# Patient Record
Sex: MALE | Race: WHITE | NOT HISPANIC OR LATINO | Employment: OTHER | URBAN - METROPOLITAN AREA
[De-identification: names, ages, dates, MRNs, and addresses within clinical notes are randomized per-mention and may not be internally consistent; named-entity substitution may affect disease eponyms.]

---

## 2017-03-31 ENCOUNTER — ALLSCRIPTS OFFICE VISIT (OUTPATIENT)
Dept: OTHER | Facility: OTHER | Age: 72
End: 2017-03-31

## 2017-03-31 ENCOUNTER — APPOINTMENT (OUTPATIENT)
Dept: LAB | Facility: HOSPITAL | Age: 72
End: 2017-03-31
Attending: UROLOGY
Payer: MEDICARE

## 2017-03-31 DIAGNOSIS — R35.0 FREQUENCY OF MICTURITION: ICD-10-CM

## 2017-03-31 DIAGNOSIS — N39.0 URINARY TRACT INFECTION: ICD-10-CM

## 2017-03-31 LAB
CLARITY UR: NORMAL
COLOR UR: YELLOW
GLUCOSE (HISTORICAL): NORMAL
HGB UR QL STRIP.AUTO: NORMAL
KETONES UR STRIP-MCNC: NORMAL MG/DL
LEUKOCYTE ESTERASE UR QL STRIP: NORMAL
NITRITE UR QL STRIP: NORMAL
PH UR STRIP.AUTO: 6.5 [PH]
PROT UR STRIP-MCNC: NORMAL MG/DL
SP GR UR STRIP.AUTO: 1.01

## 2017-03-31 PROCEDURE — 87086 URINE CULTURE/COLONY COUNT: CPT

## 2017-03-31 PROCEDURE — 87186 SC STD MICRODIL/AGAR DIL: CPT

## 2017-03-31 PROCEDURE — 87077 CULTURE AEROBIC IDENTIFY: CPT

## 2017-04-02 LAB — BACTERIA UR CULT: NORMAL

## 2017-04-15 ENCOUNTER — LAB CONVERSION - ENCOUNTER (OUTPATIENT)
Dept: OTHER | Facility: OTHER | Age: 72
End: 2017-04-15

## 2017-04-15 LAB
BACTERIA UR QL AUTO: ABNORMAL /HPF
BILIRUB UR QL STRIP: NEGATIVE
COLOR UR: YELLOW
COMMENT (HISTORICAL): ABNORMAL
CULTURE RESULT (HISTORICAL): ABNORMAL
FECAL OCCULT BLOOD DIAGNOSTIC (HISTORICAL): ABNORMAL
GLUCOSE (HISTORICAL): NEGATIVE
HYALINE CASTS #/AREA URNS LPF: ABNORMAL /LPF
KETONES UR STRIP-MCNC: NEGATIVE MG/DL
LEUKOCYTE ESTERASE UR QL STRIP: ABNORMAL
NITRITE UR QL STRIP: POSITIVE
PH UR STRIP.AUTO: 5.5 [PH] (ref 5–8)
PROT UR STRIP-MCNC: ABNORMAL MG/DL
RBC (HISTORICAL): ABNORMAL /HPF
SP GR UR STRIP.AUTO: 1.02 (ref 1–1.03)
SQUAMOUS EPITHELIAL CELLS (HISTORICAL): ABNORMAL /HPF
WBC # BLD AUTO: ABNORMAL /HPF

## 2017-04-17 ENCOUNTER — LAB CONVERSION - ENCOUNTER (OUTPATIENT)
Dept: OTHER | Facility: OTHER | Age: 72
End: 2017-04-17

## 2017-04-17 ENCOUNTER — HOSPITAL ENCOUNTER (OUTPATIENT)
Dept: RADIOLOGY | Facility: MEDICAL CENTER | Age: 72
Discharge: HOME | End: 2017-04-17
Payer: MEDICARE

## 2017-04-17 DIAGNOSIS — N39.0 URINARY TRACT INFECTION: ICD-10-CM

## 2017-04-17 LAB
BACTERIA UR QL AUTO: ABNORMAL /HPF
BILIRUB UR QL STRIP: NEGATIVE
COLOR UR: YELLOW
COMMENT (HISTORICAL): ABNORMAL
FECAL OCCULT BLOOD DIAGNOSTIC (HISTORICAL): ABNORMAL
GLUCOSE (HISTORICAL): NEGATIVE
HYALINE CASTS #/AREA URNS LPF: ABNORMAL /LPF
KETONES UR STRIP-MCNC: NEGATIVE MG/DL
LEUKOCYTE ESTERASE UR QL STRIP: ABNORMAL
NITRITE UR QL STRIP: POSITIVE
PH UR STRIP.AUTO: 5.5 [PH] (ref 5–8)
PROT UR STRIP-MCNC: ABNORMAL MG/DL
RBC (HISTORICAL): ABNORMAL /HPF
SP GR UR STRIP.AUTO: 1.02 (ref 1–1.03)
SQUAMOUS EPITHELIAL CELLS (HISTORICAL): ABNORMAL /HPF
WBC # BLD AUTO: ABNORMAL /HPF

## 2017-04-17 PROCEDURE — 51798 US URINE CAPACITY MEASURE: CPT

## 2017-05-05 DIAGNOSIS — R80.9 PROTEINURIA: ICD-10-CM

## 2017-05-05 DIAGNOSIS — D86.9 SARCOIDOSIS: ICD-10-CM

## 2017-05-05 DIAGNOSIS — N18.2 CHRONIC KIDNEY DISEASE, STAGE II (MILD): ICD-10-CM

## 2017-05-05 DIAGNOSIS — R60.0 LOCALIZED EDEMA: ICD-10-CM

## 2017-05-05 DIAGNOSIS — N39.0 URINARY TRACT INFECTION: ICD-10-CM

## 2017-05-06 ENCOUNTER — LAB CONVERSION - ENCOUNTER (OUTPATIENT)
Dept: OTHER | Facility: OTHER | Age: 72
End: 2017-05-06

## 2017-05-06 LAB
BACTERIA UR QL AUTO: ABNORMAL /HPF
BILIRUB UR QL STRIP: NEGATIVE
COLOR UR: YELLOW
COMMENT (HISTORICAL): CLEAR
CULTURE RESULT (HISTORICAL): NORMAL
FECAL OCCULT BLOOD DIAGNOSTIC (HISTORICAL): NEGATIVE
GLUCOSE (HISTORICAL): NEGATIVE
HYALINE CASTS #/AREA URNS LPF: ABNORMAL /LPF
KETONES UR STRIP-MCNC: NEGATIVE MG/DL
LEUKOCYTE ESTERASE UR QL STRIP: ABNORMAL
NITRITE UR QL STRIP: NEGATIVE
PH UR STRIP.AUTO: 5.5 [PH] (ref 5–8)
PROT UR STRIP-MCNC: NEGATIVE MG/DL
RBC (HISTORICAL): ABNORMAL /HPF
SP GR UR STRIP.AUTO: 1.02 (ref 1–1.03)
SQUAMOUS EPITHELIAL CELLS (HISTORICAL): ABNORMAL /HPF
WBC # BLD AUTO: ABNORMAL /HPF

## 2017-05-09 ENCOUNTER — ALLSCRIPTS OFFICE VISIT (OUTPATIENT)
Dept: OTHER | Facility: OTHER | Age: 72
End: 2017-05-09

## 2017-05-09 LAB
BILIRUB UR QL STRIP: NORMAL
CLARITY UR: NORMAL
COLOR UR: NORMAL
GLUCOSE (HISTORICAL): NORMAL
HGB UR QL STRIP.AUTO: NORMAL
KETONES UR STRIP-MCNC: NORMAL MG/DL
LEUKOCYTE ESTERASE UR QL STRIP: NORMAL
NITRITE UR QL STRIP: NORMAL
PH UR STRIP.AUTO: 5 [PH]
PROT UR STRIP-MCNC: NORMAL MG/DL
SP GR UR STRIP.AUTO: 1.02
UROBILINOGEN UR QL STRIP.AUTO: 0.2

## 2017-05-17 ENCOUNTER — LAB CONVERSION - ENCOUNTER (OUTPATIENT)
Dept: OTHER | Facility: OTHER | Age: 72
End: 2017-05-17

## 2017-05-17 LAB
25(OH)D3 SERPL-MCNC: 24 NG/ML (ref 30–100)
ALBUMIN SERPL BCP-MCNC: 3.5 G/DL (ref 3.8–4.8)
ALPHA 1 (HISTORICAL): 0.3 G/DL (ref 0.2–0.3)
ALPHA 2 (HISTORICAL): 0.7 G/DL (ref 0.5–0.9)
BETA-1 (HISTORICAL): 0.4 G/DL (ref 0.4–0.6)
BETA-2 (HISTORICAL): 0.4 G/DL (ref 0.2–0.5)
CALCIUM SERPL-MCNC: 9.1 MG/DL (ref 8.6–10.3)
CREATININE, RANDOM URINE (HISTORICAL): 133 MG/DL (ref 20–370)
GAMMA GLOBULIN (HISTORICAL): 1.2 G/DL (ref 0.8–1.7)
INTERPRETATION (HISTORICAL): ABNORMAL
PROT UR-MCNC: 12 MG/DL (ref 5–25)
PROT/CREAT UR: 90 MG/G CREAT (ref 22–128)
PTH-INTACT SERPL-MCNC: 31 PG/ML (ref 14–64)
TOTAL PROTEIN (HISTORICAL): 6.6 G/DL (ref 6.1–8.1)

## 2017-05-18 ENCOUNTER — LAB CONVERSION - ENCOUNTER (OUTPATIENT)
Dept: OTHER | Facility: OTHER | Age: 72
End: 2017-05-18

## 2017-05-18 LAB
25(OH)D3 SERPL-MCNC: 24 NG/ML (ref 30–100)
ALBUMIN SERPL BCP-MCNC: 100 %
ALBUMIN SERPL BCP-MCNC: 3.5 G/DL (ref 3.8–4.8)
ALPHA 1 (HISTORICAL): 0 %
ALPHA 1 (HISTORICAL): 0.3 G/DL (ref 0.2–0.3)
ALPHA 2 (HISTORICAL): 0 %
ALPHA 2 (HISTORICAL): 0.7 G/DL (ref 0.5–0.9)
BACTERIA UR QL AUTO: NORMAL /HPF
BETA-1 (HISTORICAL): 0.4 G/DL (ref 0.4–0.6)
BETA-2 (HISTORICAL): 0.4 G/DL (ref 0.2–0.5)
BETA-2 MICROGLOBULIN (HISTORICAL): 0 %
BILIRUB UR QL STRIP: NEGATIVE
BUN SERPL-MCNC: 21 MG/DL (ref 7–25)
BUN/CREA RATIO (HISTORICAL): ABNORMAL (CALC) (ref 6–22)
CALCIUM SERPL-MCNC: 9.1 MG/DL (ref 8.6–10.3)
CALCIUM SERPL-MCNC: 9.1 MG/DL (ref 8.6–10.3)
CHLORIDE SERPL-SCNC: 105 MMOL/L (ref 98–110)
CO2 SERPL-SCNC: 30 MMOL/L (ref 20–31)
COLOR UR: YELLOW
COMMENT (HISTORICAL): CLEAR
CREAT SERPL-MCNC: 1.18 MG/DL (ref 0.7–1.18)
CREATININE, RANDOM URINE (HISTORICAL): 133 MG/DL (ref 20–370)
CULTURE RESULT (HISTORICAL): NORMAL
EGFR AFRICAN AMERICAN (HISTORICAL): 71 ML/MIN/1.73M2
EGFR-AMERICAN CALC (HISTORICAL): 61 ML/MIN/1.73M2
FECAL OCCULT BLOOD DIAGNOSTIC (HISTORICAL): NEGATIVE
GAMMA GLOBULIN (HISTORICAL): 0 %
GAMMA GLOBULIN (HISTORICAL): 1.2 G/DL (ref 0.8–1.7)
GLUCOSE (HISTORICAL): 172 MG/DL (ref 65–99)
GLUCOSE (HISTORICAL): NEGATIVE
HYALINE CASTS #/AREA URNS LPF: NORMAL /LPF
INTERPRETATION (HISTORICAL): ABNORMAL
INTERPRETATION (HISTORICAL): NORMAL
KETONES UR STRIP-MCNC: NEGATIVE MG/DL
LEUKOCYTE ESTERASE UR QL STRIP: NEGATIVE
MAGNESIUM SERPL-MCNC: 1.8 MG/DL (ref 1.5–2.5)
NITRITE UR QL STRIP: NEGATIVE
PH UR STRIP.AUTO: 6 [PH] (ref 5–8)
POTASSIUM SERPL-SCNC: 4.6 MMOL/L (ref 3.5–5.3)
PROT UR STRIP-MCNC: NEGATIVE MG/DL
PROT UR-MCNC: 12 MG/DL (ref 5–25)
PROT/CREAT UR: 90 MG/G CREAT (ref 22–128)
PTH-INTACT SERPL-MCNC: 31 PG/ML (ref 14–64)
RBC (HISTORICAL): NORMAL /HPF
SODIUM SERPL-SCNC: 139 MMOL/L (ref 135–146)
SP GR UR STRIP.AUTO: 1.02 (ref 1–1.03)
SQUAMOUS EPITHELIAL CELLS (HISTORICAL): NORMAL /HPF
TOTAL PROTEIN (HISTORICAL): 6.6 G/DL (ref 6.1–8.1)
WBC # BLD AUTO: NORMAL /HPF

## 2017-05-25 ENCOUNTER — TRANSCRIBE ORDERS (OUTPATIENT)
Dept: SLEEP CENTER | Facility: CLINIC | Age: 72
End: 2017-05-25

## 2017-05-25 ENCOUNTER — HOSPITAL ENCOUNTER (OUTPATIENT)
Dept: SLEEP CENTER | Facility: CLINIC | Age: 72
Discharge: HOME | End: 2017-05-25
Payer: MEDICARE

## 2017-05-25 DIAGNOSIS — G47.33 OSA (OBSTRUCTIVE SLEEP APNEA): ICD-10-CM

## 2017-05-25 DIAGNOSIS — G47.33 OSA (OBSTRUCTIVE SLEEP APNEA): Primary | ICD-10-CM

## 2017-05-30 ENCOUNTER — ALLSCRIPTS OFFICE VISIT (OUTPATIENT)
Dept: OTHER | Facility: OTHER | Age: 72
End: 2017-05-30

## 2017-07-11 ENCOUNTER — ALLSCRIPTS OFFICE VISIT (OUTPATIENT)
Dept: OTHER | Facility: OTHER | Age: 72
End: 2017-07-11

## 2017-07-11 DIAGNOSIS — R60.0 LOCALIZED EDEMA: ICD-10-CM

## 2017-07-11 DIAGNOSIS — I10 ESSENTIAL (PRIMARY) HYPERTENSION: ICD-10-CM

## 2017-07-18 DIAGNOSIS — R60.0 LOCALIZED EDEMA: ICD-10-CM

## 2017-07-18 LAB
BUN SERPL-MCNC: 20 MG/DL (ref 7–25)
BUN/CREA RATIO (HISTORICAL): ABNORMAL (CALC) (ref 6–22)
CALCIUM SERPL-MCNC: 9.4 MG/DL (ref 8.6–10.3)
CHLORIDE SERPL-SCNC: 105 MMOL/L (ref 98–110)
CO2 SERPL-SCNC: 28 MMOL/L (ref 20–31)
CREAT SERPL-MCNC: 1.17 MG/DL (ref 0.7–1.18)
EGFR AFRICAN AMERICAN (HISTORICAL): 72 ML/MIN/1.73M2
EGFR-AMERICAN CALC (HISTORICAL): 62 ML/MIN/1.73M2
GLUCOSE (HISTORICAL): 136 MG/DL (ref 65–99)
POTASSIUM SERPL-SCNC: 4.3 MMOL/L (ref 3.5–5.3)
SODIUM SERPL-SCNC: 140 MMOL/L (ref 135–146)

## 2017-07-26 ENCOUNTER — HOSPITAL ENCOUNTER (OUTPATIENT)
Dept: SLEEP CENTER | Facility: CLINIC | Age: 72
Discharge: HOME | End: 2017-07-26
Payer: MEDICARE

## 2017-07-26 ENCOUNTER — TRANSCRIBE ORDERS (OUTPATIENT)
Dept: SLEEP CENTER | Facility: CLINIC | Age: 72
End: 2017-07-26

## 2017-07-26 DIAGNOSIS — G47.33 OSA TREATED WITH BIPAP: Primary | ICD-10-CM

## 2017-07-26 DIAGNOSIS — G47.33 OSA (OBSTRUCTIVE SLEEP APNEA): ICD-10-CM

## 2017-07-27 ENCOUNTER — HOSPITAL ENCOUNTER (OUTPATIENT)
Dept: NON INVASIVE DIAGNOSTICS | Facility: CLINIC | Age: 72
Discharge: HOME | End: 2017-07-27
Payer: MEDICARE

## 2017-07-27 DIAGNOSIS — R60.0 LOCALIZED EDEMA: ICD-10-CM

## 2017-07-27 DIAGNOSIS — I10 ESSENTIAL (PRIMARY) HYPERTENSION: ICD-10-CM

## 2017-07-27 PROCEDURE — 93306 TTE W/DOPPLER COMPLETE: CPT

## 2017-11-01 DIAGNOSIS — N18.2 CHRONIC KIDNEY DISEASE, STAGE II (MILD): ICD-10-CM

## 2017-11-01 DIAGNOSIS — I51.9 HEART DISEASE: ICD-10-CM

## 2017-11-01 DIAGNOSIS — R60.0 LOCALIZED EDEMA: ICD-10-CM

## 2017-11-02 ENCOUNTER — LAB CONVERSION - ENCOUNTER (OUTPATIENT)
Dept: OTHER | Facility: OTHER | Age: 72
End: 2017-11-02

## 2017-11-02 LAB
ALBUMIN SERPL BCP-MCNC: 3.9 G/DL (ref 3.6–5.1)
BUN SERPL-MCNC: 24 MG/DL (ref 7–25)
BUN/CREA RATIO (HISTORICAL): NORMAL (CALC) (ref 6–22)
CALCIUM SERPL-MCNC: 9.3 MG/DL (ref 8.6–10.3)
CHLORIDE SERPL-SCNC: 106 MMOL/L (ref 98–110)
CO2 SERPL-SCNC: 29 MMOL/L (ref 20–31)
CREAT SERPL-MCNC: 1.18 MG/DL (ref 0.7–1.18)
CREATININE, RANDOM URINE (HISTORICAL): 196 MG/DL (ref 20–370)
EGFR AFRICAN AMERICAN (HISTORICAL): 71 ML/MIN/1.73M2
EGFR-AMERICAN CALC (HISTORICAL): 61 ML/MIN/1.73M2
GLUCOSE (HISTORICAL): 99 MG/DL (ref 65–99)
MAGNESIUM SERPL-MCNC: 1.7 MG/DL (ref 1.5–2.5)
PHOSPHATE SERPL-MCNC: 3.1 MG/DL (ref 2.1–4.3)
POTASSIUM SERPL-SCNC: 4.7 MMOL/L (ref 3.5–5.3)
PROT UR-MCNC: 17 MG/DL (ref 5–25)
PROT/CREAT UR: 87 MG/G CREAT (ref 22–128)
SODIUM SERPL-SCNC: 142 MMOL/L (ref 135–146)

## 2017-11-16 ENCOUNTER — ALLSCRIPTS OFFICE VISIT (OUTPATIENT)
Dept: OTHER | Facility: OTHER | Age: 72
End: 2017-11-16

## 2017-11-17 NOTE — PROGRESS NOTES
Assessment    1  CKD (chronic kidney disease), stage II (585 2) (N18 2)   2  Hypertension (401 9) (I10)   3  Diastolic dysfunction (495 2) (I51 9)   4  Bilateral leg edema (782 3) (R60 0)    Plan  Bilateral leg edema, CKD (chronic kidney disease), stage II, Diastolic dysfunction,Hypertension    · Follow-up visit in 4 Months Evaluation and Treatment  Follow-up  Status: Hold For -Scheduling  Requested for: 50VQV4693   Ordered;Bilateral leg edema, CKD (chronic kidney disease), stage II, Diastolic dysfunction, Hypertension; Ordered By: Edwige Aviles Performed:  Due: 60ARQ2047  Bilateral leg edema, Diastolic dysfunction    · From  Torsemide 20 MG Oral Tablet Take 2 tablets daily To Torsemide 20 MGOral Tablet TAKE 2 TABLETS TWICE DAILY   Rx By: Edwige Aviles; Dispense: 90 Days ; #:360 Tablet; Refill: 3;For: Bilateral leg edema, Diastolic dysfunction; JITENDRA = N; Faxed To: [facility] [employer] [facility]   · (1) BASIC METABOLIC PROFILE; Status:Active; Requested for:27Nov2017;    Perform:[facility]; FRU:[id number]; Ordered;leg edema, Diastolic dysfunction; Ordered By:Cesar Greene;   · Follow-up visit in 2 months Evaluation and Treatment  Follow-up  Status: Hold For -Scheduling  Requested for: 65QFX3485   Ordered; For: Bilateral leg edema, Diastolic dysfunction; Ordered By: Edwige Aviles Performed:  Due: 89RFC3431  CKD (chronic kidney disease), stage II    · (1) MAGNESIUM; Status:Active; Requested VXZ:[id number]; Perform:[facility]; KYI:[id number];ODWGKWC; For:CKD (chronic kidney disease), stage II; Ordered By:Cesar Greene;   · (1) PTH N-TERMINAL (INTACT); Status:Active; Requested SRU:[id number]; Perform:[facility]; JFZ:[id number];YUDPDGK; For:CKD (chronic kidney disease), stage II; Ordered By:Cesar Greene;   · (1) RENAL FUNCTION PANEL; Status:Active; Requested SWQ:[id number]; Perform:[facility]; EMA:[id number];NAZZKCO; For:CKD (chronic kidney disease), stage II; Ordered By:Cesar Greene;   · (1) URINE PROTEIN CREATININE RATIO; Status:Active; Requested RADHA:[id number]; Perform:[facility]; YMM:[id number];RICARDO;(XEFJZSR kidney disease), stage II; Ordered By:Cesar Greene;    Discussion/Summary    1  Chronic kidney disease, stage II/III: Kash baseline serum creatinine is in the range of 1 1 to 1 2  His renal function is currently stable  His CKD is possibly from DM nephropathy vs hypertensive nephrosclerosis  Edema: His edema is quite impressive and appears out of proportion to findings found on echocardiogram  His systolic function is preserved and he only has grade 1 diastolic dysfunction  There is no nephrotic syndrome noted on labs  He could have some degree of pulmonary HTN given his GUICHO and obesity but his RV size was normal with good systolic function  I discussed this with Dr Xochitl De La Torre and we agreed abdominal imaging is appropriate for further edema evaluation and he will take care of ordering this  For treatment, I asked Virginia Choi to increase Torsemide to 40 mg BID  I advised him to keep his legs elevated and to continue avoiding salt in his diet  We will check labs in 1-2 weeks after increasing Torsemide  I also asked Virginia Jimbo to track his weights and bring it with his next visit in the office  We will have him back in 2 months for volume management and diuretic adjustment  Hypertension: Monitor BP with diuresis  Microalbuminuria: Continue Losartan  Mineral and bone disease: PTH is at goal from May 2017  Continue Vitamin D3 2000 units daily  Increase Torsemide to 40 mg BID  Check BMP in 7-14 days  Consider abdominal imaging - discussed with Dr Xochitl De La Torre  Check daily weights  Follow up with our NP in 2 months for diuretic adjustment  Follow up with me in 4 months  DM, HLP, obesity, GUICHO, sarcoid, obesity, nephrolithiasis      The patient, patient's family was counseled regarding diagnostic results,-- instructions for management,-- risk factor reductions,-- prognosis,-- patient and family education,-- impressions,-- importance of compliance with treatment  Possible side effects of new medications were reviewed with the patient/guardian today  The treatment plan was reviewed with the patient/guardian  The patient/guardian understands and agrees with the treatment plan      Reason For Visit  Continued evaluation of chronic kidney disease      History of Present Illness  During his last visit, we increased Tyler's torsemide to 40 mg daily  However, it does not appear that he lost weight between last visit and today  He had an echocardiogram which showed a preserved EF but did show grade 1 diastolic dysfunction  He reports following a low-sodium diet and denies any orthopnea, PND, or shortness of breath at rest  He reports good compliance to medications  Review of Systems   Constitutional: no fever,-- no chills,-- no anorexia,-- no fatigue,-- no recent weight gain-- and-- no recent weight loss  Integumentary: no rashes  Gastrointestinal: no abdominal pain,-- no constipation,-- no nausea,-- no diarrhea-- and-- no vomiting  Respiratory: no shortness of breath,-- no cough-- and-- not coughing up sputum  Cardiovascular: lower extremity edema, but-- no chest pain-- and-- no palpitations  Musculoskeletal: joint pain  Neurological: no headache-- and-- no lightheadedness  Genitourinary: no dysuria-- and-- no hematuria  Current Meds   1  Finasteride 5 MG Oral Tablet; TAKE 1 TABLET DAILY; Therapy: (Recorded:09May2017) to Recorded   2  Glimepiride 4 MG Oral Tablet; Take 1 tablet twice daily; Therapy: (Recorded:09May2017) to Recorded   3  Losartan Potassium 100 MG Oral Tablet; Take 1 tablet daily; Therapy: (Recorded:09May2017) to Recorded   4  MetFORMIN HCl - 1000 MG Oral Tablet; TAKE 1 TABLET EVERY 12 HOURS; Therapy: (Recorded:09May2017) to Recorded   5  Metoprolol Tartrate 50 MG Oral Tablet; Take 1 tablet twice daily; Therapy: (Recorded:09May2017) to Recorded   6  NovoFine 32G X 6 MM Miscellaneous;  Therapy: 28OTN5774 to Recorded   7  Torsemide 20 MG Oral Tablet; Take 2 tablets daily; Therapy: 66MQH0112 to (Evaluate:54Znv0763)  Requested for: 97TZM8401; Last Rx:[id number] Ordered   8  Tradjenta 5 MG Oral Tablet; TAKE 1 TABLET DAILY; Therapy: 45ETZ9821 to Recorded   9  Vitamin D3 2000 UNIT Oral Capsule; take 1 capsule daily; Therapy: 91OAI9531 to Recorded    Allergies  1  Surgical TAPE    Vitals  Vital Signs    Recorded: 21LIK3059 10:21AM Recorded: 15VBY8999 67:50OV   Systolic 886, RUE, Sitting    Diastolic 68, RUE, Sitting    BP CUFF SIZE Large    Height  5 ft 5 in   Weight  318 lb    BMI Calculated  52 92   BSA Calculated  2 41       Physical Exam   Constitutional: General appearance: No acute distress, well appearing and well nourished  -- Conscious, coherent, cooperative, and not in any distress  ENT: External ears and nose appear normal     Eyes: Anicteric sclerae  -- Pink conjunctivae  Pulmonary: Respiratory effort: No increased work of breathing or signs of respiratory distress  -- Auscultation of lungs: Clear to auscultation  Cardiovascular: Distinct S1, S2, normal rate, regular rhythm  Abdomen:  Soft, obese  Extremities:  There was severe edema of both legs  (unchanged)  Rash:  No rash  Neurologic: Non Focal     Psychiatric: Orientation to person, place, and time: Normal  -- and-- Mood and affect: Normal        Results/Data  (1) RENAL FUNCTION PANEL [id number] 12:07PM [facility]     Test Name Result Flag Reference   GLUCOSE 99 mg/dL  65-99   Fasting reference interval   UREA NITROGEN (BUN) 24 mg/dL  7-25   CREATININE 1 18 mg/dL  0 70-1 18     For patients >52years of age, the reference limit for Creatinine is approximately 13% higher for people identified as -American  eGFR NON-AFR   AMERICAN 61 mL/min/1 73m2  > OR = 60   eGFR AFRICAN AMERICAN 71 mL/min/1 73m2  > OR = 60   BUN/CREATININE RATIO   2-72   NOT APPLICABLE (calc)   SODIUM 142 mmol/L  135-146   POTASSIUM 4 7 mmol/L  3 5-5 3   CHLORIDE 106 mmol/L     CARBON DIOXIDE 29 mmol/L  20-31   CALCIUM 9 3 mg/dL  8 6-10 3   PHOSPHATE (AS PHOSPHORUS) 3 1 mg/dL  2 1-4 3   ALBUMIN 3 9 g/dL  3 6-5 1     (1) MAGNESIUM [id number] 12:07PM Arthur Patricia     Test Name Result Flag Reference   MAGNESIUM 1 7 mg/dL  1 5-2 5     (Q) CBC (H/H, RBC, INDICES, WBC, PLT) [id number] 12:07PM Marcial Jenkins, [address] [facility]     Test Name Result Flag Reference   WHITE BLOOD CELL COUNT 7 3 Thousand/uL  3 8-10 8   RED BLOOD CELL COUNT 4 26 Million/uL  4 20-5 80   HEMOGLOBIN 14 1 g/dL  13 2-17 1   HEMATOCRIT 41 1 %  38 5-50 0   MCV 96 5 fL  80 0-100 0   MCH 33 1 pg H 27 0-33 0   MCHC 34 3 g/dL  32 0-36 0   RDW 13 0 %  11 0-15 0   PLATELET COUNT 340 Thousand/uL  140-400     WE RECEIVED YOUR HANDWRITTEN TEST ORDER AND PERFORMED A HEMOGRAM WITH A PLATELET WITHOUT A DIFFERENTIAL  IF THIS IS NOT WHAT YOU INTENDED TO ORDER, PLEASE CONTACT YOUR LOCAL  IMMEDIATELY SO THAT WE CAN  ADJUST OUR BILLING APPROPRIATELY  YOU MAY ALSO  INQUIRE ABOUT ALTERNATIVE OR ADDITIONAL TESTING     MPV 11 8 fL  7 5-12 5           (Q) PROTEIN, TOTAL W/CREAT, RANDOM URINE 54MBF5117 12:07PM Arthur Patricia     REPORT COMMENT: FASTING:YES     Test Name Result Flag Reference   CREATININE, RANDOM URINE 196 mg/dL     PROTEIN/CREATININE RATIO 87 mg/g creat     PROTEIN, TOTAL, RANDOM UR 17 mg/dL  5-25       Signatures   Electronically signed by : Maricruz Tilley MD; Nov 16 2017 11:07AM EST                       (Author)

## 2017-11-24 ENCOUNTER — TRANSCRIBE ORDERS (OUTPATIENT)
Dept: ADMINISTRATIVE | Facility: HOSPITAL | Age: 72
End: 2017-11-24

## 2017-11-24 DIAGNOSIS — R19.8 INCREASED ABDOMINAL GIRTH: Primary | ICD-10-CM

## 2017-11-28 ENCOUNTER — HOSPITAL ENCOUNTER (OUTPATIENT)
Dept: RADIOLOGY | Facility: MEDICAL CENTER | Age: 72
Discharge: HOME | End: 2017-11-28
Payer: MEDICARE

## 2017-11-28 DIAGNOSIS — R19.8 INCREASED ABDOMINAL GIRTH: ICD-10-CM

## 2017-11-28 LAB
BUN SERPL-MCNC: 22 MG/DL (ref 7–25)
BUN/CREA RATIO (HISTORICAL): 15 (CALC) (ref 6–22)
CALCIUM SERPL-MCNC: 9 MG/DL (ref 8.6–10.3)
CHLORIDE SERPL-SCNC: 104 MMOL/L (ref 98–110)
CO2 SERPL-SCNC: 27 MMOL/L (ref 20–31)
CREAT SERPL-MCNC: 1.43 MG/DL (ref 0.7–1.18)
EGFR AFRICAN AMERICAN (HISTORICAL): 56 ML/MIN/1.73M2
EGFR-AMERICAN CALC (HISTORICAL): 48 ML/MIN/1.73M2
GLUCOSE (HISTORICAL): 154 MG/DL (ref 65–99)
POTASSIUM SERPL-SCNC: 4.3 MMOL/L (ref 3.5–5.3)
SODIUM SERPL-SCNC: 140 MMOL/L (ref 135–146)

## 2017-11-28 PROCEDURE — 74176 CT ABD & PELVIS W/O CONTRAST: CPT

## 2017-12-26 ENCOUNTER — TRANSCRIBE ORDERS (OUTPATIENT)
Dept: ADMINISTRATIVE | Facility: HOSPITAL | Age: 72
End: 2017-12-26

## 2017-12-26 DIAGNOSIS — R59.9 SWELLING OF LYMPH NODES: Primary | ICD-10-CM

## 2018-01-03 ENCOUNTER — GENERIC CONVERSION - ENCOUNTER (OUTPATIENT)
Dept: OTHER | Facility: OTHER | Age: 73
End: 2018-01-03

## 2018-01-03 ENCOUNTER — HOSPITAL ENCOUNTER (OUTPATIENT)
Dept: RADIOLOGY | Facility: MEDICAL CENTER | Age: 73
Discharge: HOME | End: 2018-01-03
Payer: MEDICARE

## 2018-01-03 DIAGNOSIS — R59.9 SWELLING OF LYMPH NODES: ICD-10-CM

## 2018-01-03 PROCEDURE — 71250 CT THORAX DX C-: CPT

## 2018-01-12 NOTE — MISCELLANEOUS
Message     Recorded as Task   Date: 05/17/2017 09:03 AM, Created By: Garrett Kahn   Task Name: Miscellaneous   Assigned To: Arnulfo Bowie   Regarding Patient: Winston Vega, Status: Active   CommentMarten Shun - 17 May 2017 9:03 AM     TASK CREATED  Please have patient start Vitamin D3 2000 units daily  Done        Active Problems    1  Acute urinary tract infection (599 0) (N39 0)   2  Acute urinary tract infection (599 0) (N39 0)   3  Bilateral leg edema (782 3) (R60 0)   4  CKD (chronic kidney disease), stage II (585 2) (N18 2)   5  Diabetes Mellitus (250 00)   6  Hypertension (401 9) (I10)   7  Microalbuminuria (791 0) (R80 9)   8  Morbid or severe obesity due to excess calories (278 01) (E66 01)   9  Nephrolithiasis (592 0) (N20 0)   10  Nocturia (788 43) (R35 1)   11  Sarcoidosis (135) (D86 9)   12  Urinary frequency (788 41) (R35 0)    Current Meds   1  Allopurinol 100 MG Oral Tablet; take 2 tablet daily; Therapy: (Recorded:09May2017) to Recorded   2  B Complex Vitamins CAPS; Therapy: (Recorded:31Mar2017) to Recorded   3  Finasteride 5 MG Oral Tablet; TAKE 1 TABLET DAILY; Therapy: (Recorded:09May2017) to Recorded   4  Glimepiride 4 MG Oral Tablet; Take 1 tablet twice daily; Therapy: (Recorded:09May2017) to Recorded   5  HM Vitamin D3 CAPS; Therapy: (Recorded:31Mar2017) to Recorded   6  Klor-Con 10 10 MEQ Oral Tablet Extended Release; Take 1 tablet daily; Therapy: (Recorded:09May2017) to Recorded   7  Losartan Potassium 100 MG Oral Tablet; Take 1 tablet daily; Therapy: (Recorded:09May2017) to Recorded   8  MetFORMIN HCl - 1000 MG Oral Tablet; TAKE 1 TABLET EVERY 12 HOURS; Therapy: (Recorded:09May2017) to Recorded   9  Metoprolol Tartrate 50 MG Oral Tablet; Take 1 tablet twice daily; Therapy: (Recorded:09May2017) to Recorded   10  NovoFine 32G X 6 MM Miscellaneous; Therapy: 22Cus0251 to Recorded   11  Omeprazole 20 MG Oral Capsule Delayed Release; take 1 capsule daily;     Therapy: (Zofia Oneal) to Recorded   12  Potassium Citrate ER 10 MEQ (1080 MG) Oral Tablet Extended Release; Take 1 tablet    twice daily; Therapy: (Recorded:09May2017) to  Requested for: 01YDA1834 Recorded   13  Tamsulosin HCl - 0 4 MG Oral Capsule; take 1 capsule daily; Therapy: (Recorded:09May2017) to Recorded   14  Torsemide 20 MG Oral Tablet; Take 1 tablet daily; Therapy: 87LMN7048 to (Nadia Velasco)  Requested for: 34GGH7579; Last    BD:22VLA2041 Ordered    Allergies    1   Surgical TAPE    Signatures   Electronically signed by : Elgin Fleischer, ; May 18 2017 12:06PM EST                       (Author)

## 2018-01-14 VITALS
HEIGHT: 65 IN | BODY MASS INDEX: 52.48 KG/M2 | WEIGHT: 315 LBS | SYSTOLIC BLOOD PRESSURE: 130 MMHG | HEART RATE: 62 BPM | DIASTOLIC BLOOD PRESSURE: 80 MMHG

## 2018-01-14 VITALS
HEIGHT: 65 IN | WEIGHT: 315 LBS | DIASTOLIC BLOOD PRESSURE: 68 MMHG | SYSTOLIC BLOOD PRESSURE: 142 MMHG | BODY MASS INDEX: 52.48 KG/M2

## 2018-01-14 VITALS
SYSTOLIC BLOOD PRESSURE: 122 MMHG | HEIGHT: 65 IN | BODY MASS INDEX: 52.48 KG/M2 | WEIGHT: 315 LBS | DIASTOLIC BLOOD PRESSURE: 62 MMHG

## 2018-01-14 VITALS
BODY MASS INDEX: 52.48 KG/M2 | DIASTOLIC BLOOD PRESSURE: 76 MMHG | HEIGHT: 65 IN | SYSTOLIC BLOOD PRESSURE: 142 MMHG | WEIGHT: 315 LBS

## 2018-01-17 ENCOUNTER — GENERIC CONVERSION - ENCOUNTER (OUTPATIENT)
Dept: OTHER | Facility: OTHER | Age: 73
End: 2018-01-17

## 2018-01-23 NOTE — MISCELLANEOUS
Provider Comments  Provider Comments:   Patient did not show for 1/17/18 appt   [facility]      Signatures   Electronically signed by : Louisa Allen, ; Jan 17 2018 10:42AM EST                       (Author)

## 2018-02-27 RX ORDER — SULFAMETHOXAZOLE AND TRIMETHOPRIM 800; 160 MG/1; MG/1
TABLET ORAL
Qty: 14 TABLET | Refills: 0 | OUTPATIENT
Start: 2018-02-27

## 2018-03-01 DIAGNOSIS — N18.2 CHRONIC KIDNEY DISEASE, STAGE II (MILD): ICD-10-CM

## 2018-03-02 LAB
ALBUMIN SERPL-MCNC: 3.9 G/DL (ref 3.6–5.1)
BUN SERPL-MCNC: 29 MG/DL (ref 7–25)
BUN/CREAT SERPL: 23 (CALC) (ref 6–22)
CALCIUM SERPL-MCNC: 9.1 MG/DL (ref 8.6–10.3)
CALCIUM SERPL-MCNC: 9.1 MG/DL (ref 8.6–10.3)
CHLORIDE SERPL-SCNC: 104 MMOL/L (ref 98–110)
CO2 SERPL-SCNC: 30 MMOL/L (ref 20–31)
CREAT SERPL-MCNC: 1.28 MG/DL (ref 0.7–1.18)
CREAT UR-MCNC: 166 MG/DL (ref 20–370)
GLUCOSE SERPL-MCNC: 127 MG/DL (ref 65–99)
MAGNESIUM SERPL-MCNC: 1.9 MG/DL (ref 1.5–2.5)
PHOSPHATE SERPL-MCNC: 3.3 MG/DL (ref 2.1–4.3)
POTASSIUM SERPL-SCNC: 4.6 MMOL/L (ref 3.5–5.3)
PROT UR-MCNC: 12 MG/DL (ref 5–25)
PROT/CREAT UR: 72 MG/G CREAT (ref 22–128)
PTH-INTACT SERPL-MCNC: 43 PG/ML (ref 14–64)
SL AMB EGFR AFRICAN AMERICAN: 64 ML/MIN/1.73M2
SL AMB EGFR NON AFRICAN AMERICAN: 55 ML/MIN/1.73M2
SODIUM SERPL-SCNC: 140 MMOL/L (ref 135–146)

## 2018-04-04 ENCOUNTER — OFFICE VISIT (OUTPATIENT)
Dept: NEPHROLOGY | Facility: CLINIC | Age: 73
End: 2018-04-04
Payer: MEDICARE

## 2018-04-04 VITALS
SYSTOLIC BLOOD PRESSURE: 130 MMHG | HEIGHT: 66 IN | BODY MASS INDEX: 50.62 KG/M2 | WEIGHT: 315 LBS | DIASTOLIC BLOOD PRESSURE: 78 MMHG

## 2018-04-04 DIAGNOSIS — R80.9 MICROALBUMINURIA: ICD-10-CM

## 2018-04-04 DIAGNOSIS — I10 ESSENTIAL HYPERTENSION: ICD-10-CM

## 2018-04-04 DIAGNOSIS — N18.2 CKD (CHRONIC KIDNEY DISEASE), STAGE II: Primary | ICD-10-CM

## 2018-04-04 DIAGNOSIS — R60.0 BILATERAL LEG EDEMA: ICD-10-CM

## 2018-04-04 PROCEDURE — 99213 OFFICE O/P EST LOW 20 MIN: CPT | Performed by: INTERNAL MEDICINE

## 2018-04-04 RX ORDER — ACETAMINOPHEN 160 MG
1 TABLET,DISINTEGRATING ORAL DAILY
COMMUNITY
Start: 2017-05-18

## 2018-04-04 RX ORDER — METOPROLOL TARTRATE 50 MG/1
1 TABLET, FILM COATED ORAL 2 TIMES DAILY
COMMUNITY

## 2018-04-04 RX ORDER — TORSEMIDE 20 MG/1
TABLET ORAL
Qty: 1 TABLET | Refills: 0 | Status: SHIPPED | OUTPATIENT
Start: 2018-04-04 | End: 2018-10-02 | Stop reason: SDUPTHER

## 2018-04-04 RX ORDER — LOSARTAN POTASSIUM 100 MG/1
1 TABLET ORAL DAILY
COMMUNITY

## 2018-04-04 RX ORDER — TORSEMIDE 20 MG/1
2 TABLET ORAL 2 TIMES DAILY
COMMUNITY
Start: 2017-07-11 | End: 2018-04-04 | Stop reason: SDUPTHER

## 2018-04-04 RX ORDER — GLIMEPIRIDE 4 MG/1
1 TABLET ORAL 2 TIMES DAILY
COMMUNITY

## 2018-04-04 RX ORDER — FINASTERIDE 5 MG/1
1 TABLET, FILM COATED ORAL DAILY
COMMUNITY
Start: 2018-02-21

## 2018-04-04 NOTE — PROGRESS NOTES
NEPHROLOGY OFFICE PROGRESS NOTE   Girtha Phalen 67 y o  male MRN: [id number]  DATE: 04/04/18  Reason for visit: Continued evaluation of CKD    ASSESSMENT & PLAN:  1  Chronic kidney disease, stage III:   · Tyler's baseline serum creatinine is in the range of 1 2 to 1 3    · His renal function is currently stable  · His CKD is possibly from DM nephropathy vs hypertensive nephrosclerosis  2  Edema:  · Appears stable overall  · Home weight has been stable at around 314 to 315 lbs (with clothes)  · Edema seems out of proportion to lab and imaging - Echo looks okay  No proteinuria  Possibly lymphedema? 3  Hypertension: BP controlled  4  Microalbuminuria: Continue Losartan  Check microalbumin to creatinine ratio  5  Mineral and bone disease: PTH is at goal from May 2017  Continue Vitamin D3 2000 units daily  6  Obesity: weight loss advised  Patient Instructions   No medication changes today  Try to lose weight by diet and exercise  Follow up in 6 months  SUBJECTIVE / INTERVAL HISTORY:  Dustin Bustos has been doing fairly well since his last office visit  There have been no recent hospitalizations or ER visits  He had chest and abdominal imaging which showed no evidence of malignancy  He denies any acute complaints at this time  He reports continued edema which does not appear to bother him  He denies any shortness of breath at rest     PMH/PSH: HTN, DM, HLP, CKD, obesity, GUICHO, sarcoidosis, nephrolithiasis, R sided hydronephrosis  Previous work up:   7/27/17 Echocardiogram: EF 65%, G1DD, moderate concentric hypertrophy  ALLERGIES:   Allergies   Allergen Reactions    Wound Dressing Adhesive      REVIEW OF SYSTEMS:  Review of Systems   Constitutional: Negative for appetite change, fatigue and fever  Respiratory: Negative for cough and shortness of breath  Cardiovascular: Positive for leg swelling  Negative for chest pain  Gastrointestinal: Negative for diarrhea, nausea and vomiting  Genitourinary: Negative for dysuria and hematuria  Musculoskeletal: Negative for arthralgias  Neurological: Negative for dizziness and light-headedness  OBJECTIVE:  /78   Ht 5' 6" (1 676 m)   Wt (!) 146 kg (322 lb)   BMI 51 97 kg/m²   Current Weight: Weight - Scale: (!) 146 kg (322 lb) Body mass index is 51 97 kg/m²  Physical Exam   Constitutional: He is oriented to person, place, and time  He appears well-developed and well-nourished  No distress  HENT:   Head: Normocephalic and atraumatic  Mouth/Throat: Mucous membranes are normal    Eyes: Conjunctivae are normal  No scleral icterus  Neck: Neck supple  No JVD present  Cardiovascular: Normal rate, regular rhythm and normal heart sounds  Pulmonary/Chest: Effort normal and breath sounds normal  No respiratory distress  Abdominal: Soft  Bowel sounds are normal    Musculoskeletal: He exhibits edema  Neurological: He is alert and oriented to person, place, and time  Skin: Skin is warm and dry  Psychiatric: He has a normal mood and affect   His behavior is normal      Medications:  Current Outpatient Prescriptions:     Cholecalciferol (VITAMIN D3) 2000 units capsule, Take 1 capsule by mouth daily, Disp: , Rfl:     finasteride (PROSCAR) 5 mg tablet, Take 1 tablet by mouth daily, Disp: , Rfl:     glimepiride (AMARYL) 4 mg tablet, Take 1 tablet by mouth 2 (two) times a day, Disp: , Rfl:     Linagliptin (TRADJENTA) 5 MG TABS, Take 1 tablet by mouth daily, Disp: , Rfl:     losartan (COZAAR) 100 MG tablet, Take 1 tablet by mouth daily, Disp: , Rfl:     metFORMIN (GLUCOPHAGE) 1000 MG tablet, Take 1 tablet by mouth every 12 (twelve) hours, Disp: , Rfl:     metoprolol tartrate (LOPRESSOR) 50 mg tablet, Take 1 tablet by mouth 2 (two) times a day, Disp: , Rfl:     torsemide (DEMADEX) 20 mg tablet, Take 2 tablets daily, Disp: 1 tablet, Rfl: 0    Laboratory Results:  Results for orders placed or performed in visit on 03/01/18   PTH, Intact and Calcium   Result Value Ref Range    PTH, Intact 43 14 - 64 pg/mL    SL AMB CALCIUM 9 1 8 6 - 10 3 mg/dL   Magnesium   Result Value Ref Range    Magnesium, Serum 1 9 1 5 - 2 5 mg/dL   Renal function panel   Result Value Ref Range    SL AMB GLUCOSE 127 (H) 65 - 99 mg/dL    BUN 29 (H) 7 - 25 mg/dL    Creatinine, Serum 1 28 (H) 0 70 - 1 18 mg/dL    eGFR Non  55 (L) > OR = 60 mL/min/1 73m2    SL AMB EGFR  64 > OR = 60 mL/min/1 73m2    SL AMB BUN/CREATININE RATIO 23 (H) 6 - 22 (calc)    SL AMB SODIUM 140 135 - 146 mmol/L    SL AMB POTASSIUM 4 6 3 5 - 5 3 mmol/L    SL AMB CHLORIDE 104 98 - 110 mmol/L    SL AMB CARBON DIOXIDE 30 20 - 31 mmol/L    SL AMB CALCIUM 9 1 8 6 - 10 3 mg/dL    Phosphorus, Serum 3 3 2 1 - 4 3 mg/dL    Serum Albumin 3 9 3 6 - 5 1 g/dL   Protein, Total w/Creat, Random Urine   Result Value Ref Range    Creatinine, Urine 166 20 - 370 mg/dL    Protein/Creat Ratio 72 22 - 128 mg/g creat    SL AMB TOTAL PROTEIN, URINE 12 5 - 25 mg/dL

## 2018-04-04 NOTE — LETTER
April 4, 2018     Jane Grande MD  [address] [facility]  [address]  [address] [place] [address] [facility]    Patient: Fiona Swift   YOB: 1945   Date of Visit: 4/4/2018       Dear Dr Charly Alcazar: Thank you for referring Camacho Wilburn to me for evaluation  Below are my notes for this consultation  If you have questions, please do not hesitate to call me  I look forward to following your patient along with you  Sincerely,        Aries Asencio MD        CC: MD Aries Alonzo MD  4/4/2018  9:17 AM  Sign at close encounter  [address] Yee NOTE   Fiona Swift 67 y o  male MRN: [id number]  DATE: 04/04/18  Reason for visit: Continued evaluation of CKD    ASSESSMENT & PLAN:  1  Chronic kidney disease, stage III:   · Tyler's baseline serum creatinine is in the range of 1 2 to 1 3    · His renal function is currently stable  · His CKD is possibly from DM nephropathy vs hypertensive nephrosclerosis  2  Edema:  · Appears stable overall  · Home weight has been stable at around 314 to 315 lbs (with clothes)  · Edema seems out of proportion to lab and imaging - Echo looks okay  No proteinuria  Possibly lymphedema? 3  Hypertension: BP controlled  4  Microalbuminuria: Continue Losartan  Check microalbumin to creatinine ratio  5  Mineral and bone disease: PTH is at goal from May 2017  Continue Vitamin D3 2000 units daily  6  Obesity: weight loss advised  Patient Instructions   No medication changes today  Try to lose weight by diet and exercise  Follow up in 6 months  SUBJECTIVE / INTERVAL HISTORY:  Jin Lee has been doing fairly well since his last office visit  There have been no recent hospitalizations or ER visits  He had chest and abdominal imaging which showed no evidence of malignancy  He denies any acute complaints at this time  He reports continued edema which does not appear to bother him    He denies any shortness of breath at rest     PMH/PSH: HTN, DM, HLP, CKD, obesity, GUICHO, sarcoidosis, nephrolithiasis, R sided hydronephrosis  Previous work up:   7/27/17 Echocardiogram: EF 65%, G1DD, moderate concentric hypertrophy  ALLERGIES:   Allergies   Allergen Reactions    Wound Dressing Adhesive      REVIEW OF SYSTEMS:  Review of Systems   Constitutional: Negative for appetite change, fatigue and fever  Respiratory: Negative for cough and shortness of breath  Cardiovascular: Positive for leg swelling  Negative for chest pain  Gastrointestinal: Negative for diarrhea, nausea and vomiting  Genitourinary: Negative for dysuria and hematuria  Musculoskeletal: Negative for arthralgias  Neurological: Negative for dizziness and light-headedness  OBJECTIVE:  /78   Ht 5' 6" (1 676 m)   Wt (!) 146 kg (322 lb)   BMI 51 97 kg/m²    Current Weight: Weight - Scale: (!) 146 kg (322 lb) Body mass index is 51 97 kg/m²  Physical Exam   Constitutional: He is oriented to person, place, and time  He appears well-developed and well-nourished  No distress  HENT:   Head: Normocephalic and atraumatic  Mouth/Throat: Mucous membranes are normal    Eyes: Conjunctivae are normal  No scleral icterus  Neck: Neck supple  No JVD present  Cardiovascular: Normal rate, regular rhythm and normal heart sounds  Pulmonary/Chest: Effort normal and breath sounds normal  No respiratory distress  Abdominal: Soft  Bowel sounds are normal    Musculoskeletal: He exhibits edema  Neurological: He is alert and oriented to person, place, and time  Skin: Skin is warm and dry  Psychiatric: He has a normal mood and affect   His behavior is normal      Medications:  Current Outpatient Prescriptions:     Cholecalciferol (VITAMIN D3) 2000 units capsule, Take 1 capsule by mouth daily, Disp: , Rfl:     finasteride (PROSCAR) 5 mg tablet, Take 1 tablet by mouth daily, Disp: , Rfl:     glimepiride (AMARYL) 4 mg tablet, Take 1 tablet by mouth 2 (two) times a day, Disp: , Rfl:   Linagliptin (TRADJENTA) 5 MG TABS, Take 1 tablet by mouth daily, Disp: , Rfl:     losartan (COZAAR) 100 MG tablet, Take 1 tablet by mouth daily, Disp: , Rfl:     metFORMIN (GLUCOPHAGE) 1000 MG tablet, Take 1 tablet by mouth every 12 (twelve) hours, Disp: , Rfl:     metoprolol tartrate (LOPRESSOR) 50 mg tablet, Take 1 tablet by mouth 2 (two) times a day, Disp: , Rfl:     torsemide (DEMADEX) 20 mg tablet, Take 2 tablets daily, Disp: 1 tablet, Rfl: 0    Laboratory Results:  Results for orders placed or performed in visit on 03/01/18   PTH, Intact and Calcium   Result Value Ref Range    PTH, Intact 43 14 - 64 pg/mL    SL AMB CALCIUM 9 1 8 6 - 10 3 mg/dL   Magnesium   Result Value Ref Range    Magnesium, Serum 1 9 1 5 - 2 5 mg/dL   Renal function panel   Result Value Ref Range    SL AMB GLUCOSE 127 (H) 65 - 99 mg/dL    BUN 29 (H) 7 - 25 mg/dL    Creatinine, Serum 1 28 (H) 0 70 - 1 18 mg/dL    eGFR Non  55 (L) > OR = 60 mL/min/1 73m2    SL AMB EGFR  64 > OR = 60 mL/min/1 73m2    SL AMB BUN/CREATININE RATIO 23 (H) 6 - 22 (calc)    SL AMB SODIUM 140 135 - 146 mmol/L    SL AMB POTASSIUM 4 6 3 5 - 5 3 mmol/L    SL AMB CHLORIDE 104 98 - 110 mmol/L    SL AMB CARBON DIOXIDE 30 20 - 31 mmol/L    SL AMB CALCIUM 9 1 8 6 - 10 3 mg/dL    Phosphorus, Serum 3 3 2 1 - 4 3 mg/dL    Serum Albumin 3 9 3 6 - 5 1 g/dL   Protein, Total w/Creat, Random Urine   Result Value Ref Range    Creatinine, Urine 166 20 - 370 mg/dL    Protein/Creat Ratio 72 22 - 128 mg/g creat    SL AMB TOTAL PROTEIN, URINE 12 5 - 25 mg/dL

## 2018-07-16 ENCOUNTER — TELEPHONE (OUTPATIENT)
Dept: NEPHROLOGY | Facility: CLINIC | Age: 73
End: 2018-07-16

## 2018-07-16 NOTE — TELEPHONE ENCOUNTER
This was an automatic refill thru [facility] when he had taken a higher dose  Wife is aware that he is to take 1-BID (or may take 2-OD)

## 2018-07-16 NOTE — TELEPHONE ENCOUNTER
Tyler's torsemide is 2 tablets twice a day and he is stating that it is too much for him and is not going to take that much

## 2018-08-15 ENCOUNTER — OFFICE VISIT (OUTPATIENT)
Dept: SLEEP CENTER | Facility: CLINIC | Age: 73
End: 2018-08-15
Payer: MEDICARE

## 2018-08-15 VITALS
BODY MASS INDEX: 50.62 KG/M2 | WEIGHT: 315 LBS | DIASTOLIC BLOOD PRESSURE: 76 MMHG | HEART RATE: 78 BPM | SYSTOLIC BLOOD PRESSURE: 132 MMHG | HEIGHT: 66 IN

## 2018-08-15 DIAGNOSIS — G47.33 OSA TREATED WITH BIPAP: ICD-10-CM

## 2018-08-15 PROCEDURE — 99213 OFFICE O/P EST LOW 20 MIN: CPT | Performed by: INTERNAL MEDICINE

## 2018-08-15 NOTE — PROGRESS NOTES
Progress Note - Sleep Center   Crow Beatty MAP:[birth year] MRN: [id number]      Reason for Visit:  67 y o male here for annual follow-up    Assessment:  Doing well on current therapy of 21/15 cm for previously diagnosed severe obstructive sleep apnea (AHI = 56 6)  Plan:  Continue same    Follow up: One year    History of Present Illness:  History of GUICHO on PAP therapy  Fully compliant and deriving benefit  Review of Systems      Genitourinary difficulty with erection   Cardiology ankle/leg swelling   Gastrointestinal none   Neurology balance problems   Constitutional none   Integumentary none   Psychiatry none   Musculoskeletal none   Pulmonary difficulty breathing when lying flat    ENT none   Endocrine none   Hematological none         Historical Information    Past Medical History:   Past Medical History:   Diagnosis Date    Bilateral leg edema     Chronic kidney disease     Diastolic dysfunction     Hypertension          Past Surgical History: History reviewed  No pertinent surgical history      Social History:   Social History     Social History    Marital status: /Civil Union     Spouse name: N/A    Number of children: N/A    Years of education: N/A     Social History Main Topics    Smoking status: Never Smoker    Smokeless tobacco: Never Used    Alcohol use 0 6 oz/week     1 Cans of beer per week      Comment: weekly    Drug use: No    Sexual activity: Not Asked     Other Topics Concern    None     Social History Narrative    None       Family History:   Family History   Problem Relation Age of Onset    Diabetes Mother     Nephrolithiasis Mother        Medications/Allergies:      Current Outpatient Prescriptions:     Cholecalciferol (VITAMIN D3) 2000 units capsule, Take 1 capsule by mouth daily, Disp: , Rfl:     finasteride (PROSCAR) 5 mg tablet, Take 1 tablet by mouth daily, Disp: , Rfl:     glimepiride (AMARYL) 4 mg tablet, Take 1 tablet by mouth 2 (two) times a day, Disp: , Rfl:     Linagliptin (TRADJENTA) 5 MG TABS, Take 1 tablet by mouth daily, Disp: , Rfl:     losartan (COZAAR) 100 MG tablet, Take 1 tablet by mouth daily, Disp: , Rfl:     metFORMIN (GLUCOPHAGE) 1000 MG tablet, Take 1 tablet by mouth every 12 (twelve) hours, Disp: , Rfl:     metoprolol tartrate (LOPRESSOR) 50 mg tablet, Take 1 tablet by mouth 2 (two) times a day, Disp: , Rfl:     torsemide (DEMADEX) 20 mg tablet, Take 2 tablets daily, Disp: 1 tablet, Rfl: 0          Objective      Vital Signs:   Vitals:    08/15/18 1300   BP: 132/76   Pulse: 78     [place] Sleepiness Scale: Total score: 0        Physical Exam:    General: Alert, appropriate, cooperative, overweight    Head: NC/AT    Skin: Warm, dry    Neuro: No motor abnormalities, cranial nerves appear intact    Extremity: No clubbing, cyanosis    PAP settin/15 cm  DME Provider:  [employer]    Counseling / Coordination of Care  Total clinic time spent today 15 minutes  Greater than 50% of total time was spent with the patient and / or family counseling and / or coordination of care  A description of the counseling / coordination of care: Discussed equipment and response to treatment  MARYLOU Guadarrama    Board Certified Sleep Specialist

## 2018-10-02 DIAGNOSIS — R60.0 BILATERAL LEG EDEMA: ICD-10-CM

## 2018-10-02 RX ORDER — TORSEMIDE 20 MG/1
TABLET ORAL
Qty: 360 TABLET | Refills: 1 | Status: SHIPPED | OUTPATIENT
Start: 2018-10-02 | End: 2019-12-04 | Stop reason: SDUPTHER

## 2018-10-05 LAB
ALBUMIN SERPL-MCNC: 3.8 G/DL (ref 3.6–5.1)
ALBUMIN/GLOB SERPL: 1.3 (CALC) (ref 1–2.5)
ALP SERPL-CCNC: 75 U/L (ref 40–115)
ALT SERPL-CCNC: 26 U/L (ref 9–46)
APPEARANCE UR: CLEAR
AST SERPL-CCNC: 18 U/L (ref 10–35)
BACTERIA UR QL AUTO: ABNORMAL /HPF
BASOPHILS # BLD AUTO: 53 CELLS/UL (ref 0–200)
BASOPHILS NFR BLD AUTO: 0.8 %
BILIRUB SERPL-MCNC: 0.6 MG/DL (ref 0.2–1.2)
BILIRUB UR QL STRIP: NEGATIVE
BUN SERPL-MCNC: 17 MG/DL (ref 7–25)
BUN/CREAT SERPL: ABNORMAL (CALC) (ref 6–22)
CALCIUM SERPL-MCNC: 9 MG/DL (ref 8.6–10.3)
CHLORIDE SERPL-SCNC: 106 MMOL/L (ref 98–110)
CO2 SERPL-SCNC: 30 MMOL/L (ref 20–32)
COLOR UR: YELLOW
CREAT SERPL-MCNC: 1.18 MG/DL (ref 0.7–1.18)
EOSINOPHIL # BLD AUTO: 251 CELLS/UL (ref 15–500)
EOSINOPHIL NFR BLD AUTO: 3.8 %
ERYTHROCYTE [DISTWIDTH] IN BLOOD BY AUTOMATED COUNT: 12.6 % (ref 11–15)
GLOBULIN SER CALC-MCNC: 3 G/DL (CALC) (ref 1.9–3.7)
GLUCOSE SERPL-MCNC: 152 MG/DL (ref 65–99)
GLUCOSE UR QL STRIP: NEGATIVE
HCT VFR BLD AUTO: 41 % (ref 38.5–50)
HGB BLD-MCNC: 13.6 G/DL (ref 13.2–17.1)
HGB UR QL STRIP: NEGATIVE
HYALINE CASTS #/AREA URNS LPF: ABNORMAL /LPF
KETONES UR QL STRIP: NEGATIVE
LEUKOCYTE ESTERASE UR QL STRIP: ABNORMAL
LYMPHOCYTES # BLD AUTO: 1208 CELLS/UL (ref 850–3900)
LYMPHOCYTES NFR BLD AUTO: 18.3 %
MAGNESIUM SERPL-MCNC: 1.6 MG/DL (ref 1.5–2.5)
MCH RBC QN AUTO: 32.6 PG (ref 27–33)
MCHC RBC AUTO-ENTMCNC: 33.2 G/DL (ref 32–36)
MCV RBC AUTO: 98.3 FL (ref 80–100)
MONOCYTES # BLD AUTO: 601 CELLS/UL (ref 200–950)
MONOCYTES NFR BLD AUTO: 9.1 %
NEUTROPHILS # BLD AUTO: 4488 CELLS/UL (ref 1500–7800)
NEUTROPHILS NFR BLD AUTO: 68 %
NITRITE UR QL STRIP: NEGATIVE
PH UR STRIP: ABNORMAL [PH] (ref 5–8)
PHOSPHATE SERPL-MCNC: 3.6 MG/DL (ref 2.1–4.3)
PLATELET # BLD AUTO: 243 THOUSAND/UL (ref 140–400)
PMV BLD REES-ECKER: 11.6 FL (ref 7.5–12.5)
POTASSIUM SERPL-SCNC: 4.5 MMOL/L (ref 3.5–5.3)
PROT SERPL-MCNC: 6.8 G/DL (ref 6.1–8.1)
PROT UR QL STRIP: NEGATIVE
RBC # BLD AUTO: 4.17 MILLION/UL (ref 4.2–5.8)
RBC #/AREA URNS HPF: ABNORMAL /HPF
SL AMB EGFR AFRICAN AMERICAN: 70 ML/MIN/1.73M2
SL AMB EGFR NON AFRICAN AMERICAN: 60 ML/MIN/1.73M2
SODIUM SERPL-SCNC: 141 MMOL/L (ref 135–146)
SP GR UR STRIP: 1.02 (ref 1–1.03)
SQUAMOUS #/AREA URNS HPF: ABNORMAL /HPF
WBC # BLD AUTO: 6.6 THOUSAND/UL (ref 3.8–10.8)
WBC #/AREA URNS HPF: ABNORMAL /HPF

## 2018-10-16 ENCOUNTER — OFFICE VISIT (OUTPATIENT)
Dept: NEPHROLOGY | Facility: CLINIC | Age: 73
End: 2018-10-16
Payer: MEDICARE

## 2018-10-16 VITALS
DIASTOLIC BLOOD PRESSURE: 80 MMHG | HEIGHT: 66 IN | SYSTOLIC BLOOD PRESSURE: 132 MMHG | BODY MASS INDEX: 50.62 KG/M2 | WEIGHT: 315 LBS

## 2018-10-16 DIAGNOSIS — E66.01 MORBID OBESITY (HCC): ICD-10-CM

## 2018-10-16 DIAGNOSIS — R60.0 BILATERAL LEG EDEMA: ICD-10-CM

## 2018-10-16 DIAGNOSIS — I10 ESSENTIAL HYPERTENSION: ICD-10-CM

## 2018-10-16 DIAGNOSIS — N18.2 CKD (CHRONIC KIDNEY DISEASE), STAGE II: Primary | ICD-10-CM

## 2018-10-16 PROCEDURE — 99214 OFFICE O/P EST MOD 30 MIN: CPT | Performed by: INTERNAL MEDICINE

## 2018-10-16 NOTE — PROGRESS NOTES
NEPHROLOGY OFFICE PROGRESS NOTE   Marie Flynn 75 y o  male MRN: [id number]  DATE: 10/16/18  Reason for visit: Continued evaluation of CKD    ASSESSMENT & PLAN:  1  Chronic kidney disease, stage III:   · Tyler's baseline serum creatinine is in the range of 1 2 to 1 3    · His most recent creatinine is 1 18 from 10/4/18 - stable  · His CKD is possibly from DM nephropathy vs hypertensive nephrosclerosis  2  Edema:  · His home weights from a few months ago are in the same range (314 to 315 lbs)  · His office weight is 3 lbs lower and his edema remains unchanged  · This is likely due to his non compliance with the diuretics  · I encouraged him to improve his diuretic compliance  3  Hypertension: BP close to goal  No changes  4  Microalbuminuria: Continue Losartan  5  Mineral and bone disease:   · PTH was 43 in March 1 ,2018 - at goal    · Ca and Phos are at goal    · Continue Vitamin D3 2000 units daily  6  Obesity: Law Gardner continues to be non compliant with diet and exercise  I appraised him on the risks of obesity and asked him to consider formally consulting with weight management  I gave him a referral for such today  Patient Instructions   Please consider consulting with weight management  Check blood test in 6 months and 1 year  Follow up in 1 year  SUBJECTIVE / INTERVAL HISTORY:  Law Gardner was last seen in April 4, 2018  Since that time, there have been no recent hospitalizations or ER visits  His wife reports that he does not take the diuretic on a regular basis at home  On questioning him about this, he admits to not taking it 3 to 4 times a week  He continues to have edema which he reports is not bothering him  His home weight continues to be around 314 to 315 lbs and he has not lost any weight over the summer  He does not check his BP at home  PMH/PSH: HTN, DM, HLP, CKD, obesity, GUICHO, sarcoidosis, nephrolithiasis, R sided hydronephrosis       Previous work up:   7/27/17 Echocardiogram: EF 65%, G1DD, moderate concentric hypertrophy  ALLERGIES:   Allergies   Allergen Reactions    Wound Dressing Adhesive      REVIEW OF SYSTEMS:  Review of Systems   Constitutional: Negative for appetite change, fatigue and fever  Respiratory: Negative for cough and shortness of breath  Cardiovascular: Positive for leg swelling  Negative for chest pain  Gastrointestinal: Negative for diarrhea, nausea and vomiting  Genitourinary: Negative for dysuria and hematuria  Musculoskeletal: Positive for arthralgias  Negative for back pain  Neurological: Negative for dizziness and light-headedness  OBJECTIVE:  /80   Ht 5' 6" (1 676 m)   Wt (!) 145 kg (319 lb)   BMI 51 49 kg/m²   Current Weight:   Body mass index is 51 49 kg/m²  Physical Exam   Constitutional: He is oriented to person, place, and time  He appears well-developed and well-nourished  HENT:   Head: Normocephalic and atraumatic  Mouth/Throat: Mucous membranes are normal    Eyes: Conjunctivae are normal  No scleral icterus  Neck: Neck supple  No JVD present  Cardiovascular: Normal rate, regular rhythm and normal heart sounds  Pulmonary/Chest: Effort normal and breath sounds normal    Abdominal: Soft  Bowel sounds are normal    Musculoskeletal: He exhibits edema (moderate to severe leg edema  )  Neurological: He is alert and oriented to person, place, and time  Skin: Skin is warm and dry  He is not diaphoretic  Psychiatric: He has a normal mood and affect   His behavior is normal      Medications:  Current Outpatient Prescriptions:     Cholecalciferol (VITAMIN D3) 2000 units capsule, Take 1 capsule by mouth daily, Disp: , Rfl:     finasteride (PROSCAR) 5 mg tablet, Take 1 tablet by mouth daily, Disp: , Rfl:     glimepiride (AMARYL) 4 mg tablet, Take 1 tablet by mouth 2 (two) times a day, Disp: , Rfl:     Linagliptin (TRADJENTA) 5 MG TABS, Take 1 tablet by mouth daily, Disp: , Rfl:     losartan (COZAAR) 100 MG tablet, Take 1 tablet by mouth daily, Disp: , Rfl:     metFORMIN (GLUCOPHAGE) 1000 MG tablet, Take 1 tablet by mouth every 12 (twelve) hours, Disp: , Rfl:     metoprolol tartrate (LOPRESSOR) 50 mg tablet, Take 1 tablet by mouth 2 (two) times a day, Disp: , Rfl:     torsemide (DEMADEX) 20 mg tablet, TAKE 2 TABLETS BY MOUTH TWO TIMES DAILY, Disp: 360 tablet, Rfl: 1    Laboratory Results:  Results for orders placed or performed in visit on 10/04/18   Magnesium   Result Value Ref Range    Magnesium, Serum 1 6 1 5 - 2 5 mg/dL   Phosphorus   Result Value Ref Range    Phosphorus, Serum 3 6 2 1 - 4 3 mg/dL   Comprehensive metabolic panel   Result Value Ref Range    Glucose 152 (H) 65 - 99 mg/dL    BUN 17 7 - 25 mg/dL    Creatinine 1 18 0 70 - 1 18 mg/dL    eGFR Non  60 > OR = 60 mL/min/1 73m2    SL AMB EGFR  70 > OR = 60 mL/min/1 73m2    SL AMB BUN/CREATININE RATIO NOT APPLICABLE 6 - 22 (calc)    Sodium 141 135 - 146 mmol/L    SL AMB POTASSIUM 4 5 3 5 - 5 3 mmol/L    Chloride 106 98 - 110 mmol/L    CO2 30 20 - 32 mmol/L    SL AMB CALCIUM 9 0 8 6 - 10 3 mg/dL    SL AMB PROTEIN, TOTAL 6 8 6 1 - 8 1 g/dL    Albumin 3 8 3 6 - 5 1 g/dL    Globulin 3 0 1 9 - 3 7 g/dL (calc)    SL AMB ALBUMIN/GLOBULIN RATIO 1 3 1 0 - 2 5 (calc)    TOTAL BILIRUBIN 0 6 0 2 - 1 2 mg/dL    Alkaline Phosphatase 75 40 - 115 U/L    SL AMB AST 18 10 - 35 U/L    SL AMB ALT 26 9 - 46 U/L   Urinalysis with microscopic   Result Value Ref Range    Color UA YELLOW YELLOW    Urine Appearance CLEAR CLEAR    Specific Gravity 1 019 1 001 - 1 035    Ph < OR = 5 0 5 0 - 8 0    SL AMB GLUCOSE, URINE, QUAL  NEGATIVE NEGATIVE    SL AMB BILIRUBIN, URINE NEGATIVE NEGATIVE    SL AMB KETONE, URINE, QUAL  NEGATIVE NEGATIVE    SL AMB BLOOD, URINE NEGATIVE NEGATIVE    SL AMB PROTEIN, URINE, QUAL  NEGATIVE NEGATIVE    SL AMB NITRITES URINE, QUAL   NEGATIVE NEGATIVE    SL AMB LEUKOCYTE ESTERASE URINE 1+ (A) NEGATIVE    SL AMB WBC, URINE 0-5 < OR = 5 /HPF    RBC, Urine NONE SEEN < OR = 2 /HPF    Squamous Epithelial Cells 0-5 < OR = 5 /HPF    Bacteria, UA NONE SEEN NONE SEEN /HPF    Hyaline Casts NONE SEEN NONE SEEN /LPF   CBC and differential   Result Value Ref Range    White Blood Cell Count 6 6 3 8 - 10 8 Thousand/uL    Red Blood Cell Count 4 17 (L) 4 20 - 5 80 Million/uL    Hemoglobin 13 6 13 2 - 17 1 g/dL    HCT 41 0 38 5 - 50 0 %    MCV 98 3 80 0 - 100 0 fL    MCH 32 6 27 0 - 33 0 pg    MCHC 33 2 32 0 - 36 0 g/dL    RDW 12 6 11 0 - 15 0 %    Platelet Count 388 762 - 400 Thousand/uL    SL AMB MPV 11 6 7 5 - 12 5 fL    Neutrophils (Absolute) 4,488 1,500 - 7,800 cells/uL    Lymphocytes (Absolute) 1,208 850 - 3,900 cells/uL    Monocytes (Absolute) 601 200 - 950 cells/uL    Eosinophils (Absolute) 251 15 - 500 cells/uL    Basophils (Absolute) 53 0 - 200 cells/uL    Neutrophils 68 %    Lymphocytes 18 3 %    Monocytes 9 1 %    Eosinophils 3 8 %    Basophils Relative 0 8 %

## 2018-10-16 NOTE — PATIENT INSTRUCTIONS
Please consider consulting with weight management  Check blood test in 6 months and 1 year  Follow up in 1 year

## 2018-10-16 NOTE — LETTER
October 16, 2018     Waleska Cates MD  [address]  [address]  [address] [place] [address] [facility]    Patient: Marie Flynn   YOB: 1945   Date of Visit: 10/16/2018       Dear Dr Nay Taylor: Thank you for referring Fransisco Alfonso to me for evaluation  Below are my notes for this consultation  If you have questions, please do not hesitate to call me  I look forward to following your patient along with you  Sincerely,        Aldair Terry MD        CC: No Recipients  Aldair Terry MD  10/16/2018 11:01 AM  Sign at close encounter  [address] NOTE   Marei Flynn 75 y o  male MRN: [id number]  DATE: 10/16/18  Reason for visit: Continued evaluation of CKD    ASSESSMENT & PLAN:  1  Chronic kidney disease, stage III:   · Tyler's baseline serum creatinine is in the range of 1 2 to 1 3    · His most recent creatinine is 1 18 from 10/4/18 - stable  · His CKD is possibly from DM nephropathy vs hypertensive nephrosclerosis  2  Edema:  · His home weights from a few months ago are in the same range (314 to 315 lbs)  · His office weight is 3 lbs lower and his edema remains unchanged  · This is likely due to his non compliance with the diuretics  · I encouraged him to improve his diuretic compliance  3  Hypertension: BP close to goal  No changes  4  Microalbuminuria: Continue Losartan  5  Mineral and bone disease:   · PTH was 43 in March 1 ,2018 - at goal    · Ca and Phos are at goal    · Continue Vitamin D3 2000 units daily  6  Obesity: Law Gardner continues to be non compliant with diet and exercise  I appraised him on the risks of obesity and asked him to consider formally consulting with weight management  I gave him a referral for such today  Patient Instructions   Please consider consulting with weight management  Check blood test in 6 months and 1 year  Follow up in 1 year  SUBJECTIVE / INTERVAL HISTORY:  Law Gardner was last seen in April 4, 2018   Since that time, there have been no recent hospitalizations or ER visits  His wife reports that he does not take the diuretic on a regular basis at home  On questioning him about this, he admits to not taking it 3 to 4 times a week  He continues to have edema which he reports is not bothering him  His home weight continues to be around 314 to 315 lbs and he has not lost any weight over the summer  He does not check his BP at home  PMH/PSH: HTN, DM, HLP, CKD, obesity, GUICHO, sarcoidosis, nephrolithiasis, R sided hydronephrosis  Previous work up:   7/27/17 Echocardiogram: EF 65%, G1DD, moderate concentric hypertrophy  ALLERGIES:   Allergies   Allergen Reactions    Wound Dressing Adhesive      REVIEW OF SYSTEMS:  Review of Systems   Constitutional: Negative for appetite change, fatigue and fever  Respiratory: Negative for cough and shortness of breath  Cardiovascular: Positive for leg swelling  Negative for chest pain  Gastrointestinal: Negative for diarrhea, nausea and vomiting  Genitourinary: Negative for dysuria and hematuria  Musculoskeletal: Positive for arthralgias  Negative for back pain  Neurological: Negative for dizziness and light-headedness  OBJECTIVE:  /80   Ht 5' 6" (1 676 m)   Wt (!) 145 kg (319 lb)   BMI 51 49 kg/m²    Current Weight:   Body mass index is 51 49 kg/m²  Physical Exam   Constitutional: He is oriented to person, place, and time  He appears well-developed and well-nourished  HENT:   Head: Normocephalic and atraumatic  Mouth/Throat: Mucous membranes are normal    Eyes: Conjunctivae are normal  No scleral icterus  Neck: Neck supple  No JVD present  Cardiovascular: Normal rate, regular rhythm and normal heart sounds  Pulmonary/Chest: Effort normal and breath sounds normal    Abdominal: Soft  Bowel sounds are normal    Musculoskeletal: He exhibits edema (moderate to severe leg edema  )  Neurological: He is alert and oriented to person, place, and time  Skin: Skin is warm and dry  He is not diaphoretic  Psychiatric: He has a normal mood and affect   His behavior is normal      Medications:  Current Outpatient Prescriptions:     Cholecalciferol (VITAMIN D3) 2000 units capsule, Take 1 capsule by mouth daily, Disp: , Rfl:     finasteride (PROSCAR) 5 mg tablet, Take 1 tablet by mouth daily, Disp: , Rfl:     glimepiride (AMARYL) 4 mg tablet, Take 1 tablet by mouth 2 (two) times a day, Disp: , Rfl:     Linagliptin (TRADJENTA) 5 MG TABS, Take 1 tablet by mouth daily, Disp: , Rfl:     losartan (COZAAR) 100 MG tablet, Take 1 tablet by mouth daily, Disp: , Rfl:     metFORMIN (GLUCOPHAGE) 1000 MG tablet, Take 1 tablet by mouth every 12 (twelve) hours, Disp: , Rfl:     metoprolol tartrate (LOPRESSOR) 50 mg tablet, Take 1 tablet by mouth 2 (two) times a day, Disp: , Rfl:     torsemide (DEMADEX) 20 mg tablet, TAKE 2 TABLETS BY MOUTH TWO TIMES DAILY, Disp: 360 tablet, Rfl: 1    Laboratory Results:  Results for orders placed or performed in visit on 10/04/18   Magnesium   Result Value Ref Range    Magnesium, Serum 1 6 1 5 - 2 5 mg/dL   Phosphorus   Result Value Ref Range    Phosphorus, Serum 3 6 2 1 - 4 3 mg/dL   Comprehensive metabolic panel   Result Value Ref Range    Glucose 152 (H) 65 - 99 mg/dL    BUN 17 7 - 25 mg/dL    Creatinine 1 18 0 70 - 1 18 mg/dL    eGFR Non  60 > OR = 60 mL/min/1 73m2    SL AMB EGFR  70 > OR = 60 mL/min/1 73m2    SL AMB BUN/CREATININE RATIO NOT APPLICABLE 6 - 22 (calc)    Sodium 141 135 - 146 mmol/L    SL AMB POTASSIUM 4 5 3 5 - 5 3 mmol/L    Chloride 106 98 - 110 mmol/L    CO2 30 20 - 32 mmol/L    SL AMB CALCIUM 9 0 8 6 - 10 3 mg/dL    SL AMB PROTEIN, TOTAL 6 8 6 1 - 8 1 g/dL    Albumin 3 8 3 6 - 5 1 g/dL    Globulin 3 0 1 9 - 3 7 g/dL (calc)    SL AMB ALBUMIN/GLOBULIN RATIO 1 3 1 0 - 2 5 (calc)    TOTAL BILIRUBIN 0 6 0 2 - 1 2 mg/dL    Alkaline Phosphatase 75 40 - 115 U/L    SL AMB AST 18 10 - 35 U/L    SL AMB ALT 26 9 - 46 U/L   Urinalysis with microscopic   Result Value Ref Range    Color UA YELLOW YELLOW    Urine Appearance CLEAR CLEAR    Specific Gravity 1 019 1 001 - 1 035    Ph < OR = 5 0 5 0 - 8 0    SL AMB GLUCOSE, URINE, QUAL  NEGATIVE NEGATIVE    SL AMB BILIRUBIN, URINE NEGATIVE NEGATIVE    SL AMB KETONE, URINE, QUAL  NEGATIVE NEGATIVE    SL AMB BLOOD, URINE NEGATIVE NEGATIVE    SL AMB PROTEIN, URINE, QUAL  NEGATIVE NEGATIVE    SL AMB NITRITES URINE, QUAL   NEGATIVE NEGATIVE    SL AMB LEUKOCYTE ESTERASE URINE 1+ (A) NEGATIVE    SL AMB WBC, URINE 0-5 < OR = 5 /HPF    RBC, Urine NONE SEEN < OR = 2 /HPF    Squamous Epithelial Cells 0-5 < OR = 5 /HPF    Bacteria, UA NONE SEEN NONE SEEN /HPF    Hyaline Casts NONE SEEN NONE SEEN /LPF   CBC and differential   Result Value Ref Range    White Blood Cell Count 6 6 3 8 - 10 8 Thousand/uL    Red Blood Cell Count 4 17 (L) 4 20 - 5 80 Million/uL    Hemoglobin 13 6 13 2 - 17 1 g/dL    HCT 41 0 38 5 - 50 0 %    MCV 98 3 80 0 - 100 0 fL    MCH 32 6 27 0 - 33 0 pg    MCHC 33 2 32 0 - 36 0 g/dL    RDW 12 6 11 0 - 15 0 %    Platelet Count 090 639 - 400 Thousand/uL    SL AMB MPV 11 6 7 5 - 12 5 fL    Neutrophils (Absolute) 4,488 1,500 - 7,800 cells/uL    Lymphocytes (Absolute) 1,208 850 - 3,900 cells/uL    Monocytes (Absolute) 601 200 - 950 cells/uL    Eosinophils (Absolute) 251 15 - 500 cells/uL    Basophils (Absolute) 53 0 - 200 cells/uL    Neutrophils 68 %    Lymphocytes 18 3 %    Monocytes 9 1 %    Eosinophils 3 8 %    Basophils Relative 0 8 %

## 2019-04-23 ENCOUNTER — APPOINTMENT (OUTPATIENT)
Dept: RADIOLOGY | Facility: MEDICAL CENTER | Age: 74
End: 2019-04-23
Payer: MEDICARE

## 2019-04-23 ENCOUNTER — OFFICE VISIT (OUTPATIENT)
Dept: URGENT CARE | Facility: MEDICAL CENTER | Age: 74
End: 2019-04-23
Payer: MEDICARE

## 2019-04-23 VITALS
RESPIRATION RATE: 22 BRPM | DIASTOLIC BLOOD PRESSURE: 78 MMHG | WEIGHT: 315 LBS | OXYGEN SATURATION: 100 % | BODY MASS INDEX: 50.62 KG/M2 | HEIGHT: 66 IN | SYSTOLIC BLOOD PRESSURE: 138 MMHG | HEART RATE: 86 BPM | TEMPERATURE: 97.7 F

## 2019-04-23 DIAGNOSIS — R07.81 RIB PAIN ON RIGHT SIDE: Primary | ICD-10-CM

## 2019-04-23 DIAGNOSIS — R07.81 RIB PAIN ON RIGHT SIDE: ICD-10-CM

## 2019-04-23 PROCEDURE — 71101 X-RAY EXAM UNILAT RIBS/CHEST: CPT

## 2019-04-23 PROCEDURE — 99213 OFFICE O/P EST LOW 20 MIN: CPT | Performed by: PHYSICIAN ASSISTANT

## 2019-04-23 PROCEDURE — G0463 HOSPITAL OUTPT CLINIC VISIT: HCPCS | Performed by: PHYSICIAN ASSISTANT

## 2019-04-23 RX ORDER — CYCLOBENZAPRINE HCL 10 MG
10 TABLET ORAL 3 TIMES DAILY PRN
Qty: 15 TABLET | Refills: 0 | Status: SHIPPED | OUTPATIENT
Start: 2019-04-23

## 2019-05-01 ENCOUNTER — TELEPHONE (OUTPATIENT)
Dept: NEPHROLOGY | Facility: CLINIC | Age: 74
End: 2019-05-01

## 2019-05-08 LAB
EXT GLUCOSE BLD: 146
EXTERNAL BUN: 15
EXTERNAL CALCIUM: 9.3
EXTERNAL CHLORIDE: 101
EXTERNAL CO2: 27
EXTERNAL CREATININE: 1
EXTERNAL EGFR: 72.9
EXTERNAL POTASSIUM: 4.7
EXTERNAL SODIUM: 143

## 2019-05-09 ENCOUNTER — TELEPHONE (OUTPATIENT)
Dept: NEPHROLOGY | Facility: CLINIC | Age: 74
End: 2019-05-09

## 2019-08-12 ENCOUNTER — TELEPHONE (OUTPATIENT)
Dept: NEPHROLOGY | Facility: CLINIC | Age: 74
End: 2019-08-12

## 2019-08-12 NOTE — TELEPHONE ENCOUNTER
I called and left message for patient to call back to schedule October follow up appt with Dr Patricio Bowling

## 2019-08-13 ENCOUNTER — OFFICE VISIT (OUTPATIENT)
Dept: SLEEP CENTER | Facility: CLINIC | Age: 74
End: 2019-08-13
Payer: MEDICARE

## 2019-08-13 VITALS
BODY MASS INDEX: 49.98 KG/M2 | SYSTOLIC BLOOD PRESSURE: 124 MMHG | WEIGHT: 311 LBS | DIASTOLIC BLOOD PRESSURE: 60 MMHG | HEIGHT: 66 IN

## 2019-08-13 DIAGNOSIS — G47.33 OSA (OBSTRUCTIVE SLEEP APNEA): Primary | ICD-10-CM

## 2019-08-13 PROCEDURE — 99213 OFFICE O/P EST LOW 20 MIN: CPT | Performed by: INTERNAL MEDICINE

## 2019-08-13 NOTE — PROGRESS NOTES
Progress Note - Sleep Center   Candace Contreras OCX:[birth year] MRN: [id number]      Reason for Visit:  75 y o male here for annual follow-up    Assessment:  Doing well on current therapy of BiPAP 21/15 cm for very severe GUICHO (AHI = 56 6)  Plan:  Continue same    Follow up: One year    History of Present Illness:  History of GUICHO on PAP therapy  Fully compliant and deriving benefit  Review of Systems      Genitourinary difficulty with erection   Cardiology ankle/leg swelling   Gastrointestinal none   Neurology none   Constitutional none   Integumentary none   Psychiatry none   Musculoskeletal none   Pulmonary difficulty breathing when lying flat    ENT none   Endocrine none   Hematological none         I have reviewed and updated the review of systems as necessary      Historical Information    Past Medical History:   Past Medical History:   Diagnosis Date    Bilateral leg edema     Chronic kidney disease     Diastolic dysfunction     Hypertension          Past Surgical History: No past surgical history on file  Social History:   Social History     Socioeconomic History    Marital status: /Civil Union     Spouse name: None    Number of children: None    Years of education: None    Highest education level: None   Occupational History    None   Social Needs    Financial resource strain: None    Food insecurity:     Worry: None     Inability: None    Transportation needs:     Medical: None     Non-medical: None   Tobacco Use    Smoking status: Never Smoker    Smokeless tobacco: Never Used   Substance and Sexual Activity    Alcohol use:  Yes     Alcohol/week: 1 0 standard drinks     Types: 1 Cans of beer per week     Comment: weekly    Drug use: No    Sexual activity: None   Lifestyle    Physical activity:     Days per week: None     Minutes per session: None    Stress: None   Relationships    Social connections:     Talks on phone: None     Gets together: None     Attends [employer] service: None     Active member of club or organization: None     Attends meetings of clubs or organizations: None     Relationship status: None    Intimate partner violence:     Fear of current or ex partner: None     Emotionally abused: None     Physically abused: None     Forced sexual activity: None   Other Topics Concern    None   Social History Narrative    None       Family History:   Family History   Problem Relation Age of Onset    Diabetes Mother     Nephrolithiasis Mother        Medications/Allergies:      Current Outpatient Medications:     finasteride (PROSCAR) 5 mg tablet, Take 1 tablet by mouth daily, Disp: , Rfl:     glimepiride (AMARYL) 4 mg tablet, Take 1 tablet by mouth 2 (two) times a day, Disp: , Rfl:     losartan (COZAAR) 100 MG tablet, Take 1 tablet by mouth daily, Disp: , Rfl:     metFORMIN (GLUCOPHAGE) 1000 MG tablet, Take 1 tablet by mouth every 12 (twelve) hours, Disp: , Rfl:     metoprolol tartrate (LOPRESSOR) 50 mg tablet, Take 1 tablet by mouth 2 (two) times a day, Disp: , Rfl:     Cholecalciferol (VITAMIN D3) 2000 units capsule, Take 1 capsule by mouth daily, Disp: , Rfl:     cyclobenzaprine (FLEXERIL) 10 mg tablet, Take 1 tablet (10 mg total) by mouth 3 (three) times a day as needed for muscle spasms (Patient not taking: Reported on 8/13/2019), Disp: 15 tablet, Rfl: 0    Linagliptin (TRADJENTA) 5 MG TABS, Take 1 tablet by mouth daily, Disp: , Rfl:     torsemide (DEMADEX) 20 mg tablet, TAKE 2 TABLETS BY MOUTH TWO TIMES DAILY (Patient not taking: Reported on 8/13/2019), Disp: 360 tablet, Rfl: 1          Objective      Vital Signs:   Vitals:    08/13/19 1123   BP: 124/60     [place] Sleepiness Scale:  Total score: 2        Physical Exam:    General: Alert, appropriate, cooperative, overweight    Head: NC/AT    Skin: Warm, dry    Neuro: No motor abnormalities, cranial nerves appear intact    Extremity: No clubbing, cyanosis      DME Provider:  Mikie Saavedra / Coordination of Care   I have spent 15 minutes with the patient today in which greater than 50% of this time was spent in counseling/coordination of care regarding: equipment and compliance  Board Certified Sleep Specialist    Portions of the record may have been created with voice recognition software  Occasional wrong word or "sound a like" substitutions may have occurred due to the inherent limitations of voice recognition software  Read the chart carefully and recognize, using context, where substitutions have occurred

## 2019-10-01 LAB
CREAT ?TM UR-SCNC: 66 UMOL/L
EXT PROTEIN URINE: 11
PROT/CREAT UR: 166.7 MG/G{CREAT}

## 2019-10-07 ENCOUNTER — TELEPHONE (OUTPATIENT)
Dept: NEPHROLOGY | Facility: CLINIC | Age: 74
End: 2019-10-07

## 2019-10-07 NOTE — TELEPHONE ENCOUNTER
----- Message from Camila Nguyen MD sent at 10/7/2019 10:59 AM EDT -----  Please call patient and inform him that renal function is stable on latest labs

## 2019-10-07 NOTE — TELEPHONE ENCOUNTER
Left message for the patient to call back in regards to his recent lab test results and for the patient to have a follow up in 2-3 months        Carlotta Warner MA

## 2019-10-08 NOTE — TELEPHONE ENCOUNTER
Left message for the patient to call back in regards to his recent lab test results and to make a follow up with Dr Angel Estrada in 2-3 months        Jama Zamraripa MA

## 2019-10-09 NOTE — TELEPHONE ENCOUNTER
Left message for the patient to call back in regards to his recent lab test results and to make a follow up in 2-3 months        Jeanie Troncoso MA

## 2019-10-10 ENCOUNTER — DOCUMENTATION (OUTPATIENT)
Dept: NEPHROLOGY | Facility: CLINIC | Age: 74
End: 2019-10-10

## 2019-10-10 LAB
ALBUMIN SNV-MCNC: 4 G/DL
ALP SERPL-CCNC: 86 U/L (ref 46–116)
ALT SERPL W P-5'-P-CCNC: 15 U/L (ref 12–78)
AST SERPL W P-5'-P-CCNC: 15 U/L (ref 5–45)
BUN SERPL-MCNC: 17 MG/DL (ref 5–25)
CALCIUM SERPL-MCNC: 9.5 MG/DL (ref 8.3–10.1)
CHLORIDE SERPL-SCNC: 103 MMOL/L (ref 100–108)
CO2 SERPL-SCNC: 26 MMOL/L (ref 21–32)
CREAT SERPL-MCNC: 1.1 MG/DL (ref 0.6–1.3)
GLUCOSE SERPL-MCNC: 115 MG/DL
MAGNESIUM SERPL-MCNC: 1.8 MG/DL (ref 1.6–2.6)
PHOSPHATE SERPL-MCNC: 4.2 MG/DL (ref 2.3–4.1)
POTASSIUM SERPL-SCNC: 4.9 MMOL/L (ref 3.5–5.3)
PTH-INTACT SERPL-MCNC: 24.67 PG/ML
SODIUM SERPL-SCNC: 145 MMOL/L (ref 136–145)

## 2019-10-10 NOTE — TELEPHONE ENCOUNTER
Spoke with the patient and he wife and both are aware of his lab test results and a follow up appointment has been made          Serina Grey MA

## 2019-12-04 ENCOUNTER — OFFICE VISIT (OUTPATIENT)
Dept: NEPHROLOGY | Facility: CLINIC | Age: 74
End: 2019-12-04
Payer: MEDICARE

## 2019-12-04 VITALS
DIASTOLIC BLOOD PRESSURE: 78 MMHG | SYSTOLIC BLOOD PRESSURE: 138 MMHG | WEIGHT: 308 LBS | BODY MASS INDEX: 49.5 KG/M2 | HEIGHT: 66 IN

## 2019-12-04 DIAGNOSIS — N18.2 CKD (CHRONIC KIDNEY DISEASE), STAGE II: Primary | ICD-10-CM

## 2019-12-04 DIAGNOSIS — N18.30 BENIGN HYPERTENSION WITH CHRONIC KIDNEY DISEASE, STAGE III (HCC): ICD-10-CM

## 2019-12-04 DIAGNOSIS — R60.0 BILATERAL LEG EDEMA: ICD-10-CM

## 2019-12-04 DIAGNOSIS — R80.9 MICROALBUMINURIA: ICD-10-CM

## 2019-12-04 DIAGNOSIS — I12.9 BENIGN HYPERTENSION WITH CHRONIC KIDNEY DISEASE, STAGE III (HCC): ICD-10-CM

## 2019-12-04 PROCEDURE — 99213 OFFICE O/P EST LOW 20 MIN: CPT | Performed by: INTERNAL MEDICINE

## 2019-12-04 RX ORDER — TORSEMIDE 20 MG/1
20 TABLET ORAL DAILY
Qty: 90 TABLET | Refills: 2 | Status: SHIPPED | OUTPATIENT
Start: 2019-12-04

## 2019-12-04 NOTE — LETTER
December 4, 2019     Ankit Edmonds MD  [address] [facility]  [address]  [address] [place] [address] [facility]    Patient: Mendy La   YOB: 1945   Date of Visit: 12/4/2019       Dear Dr Yessi Ceron: Thank you for referring Fercho Bynum to me for evaluation  Below are my notes for this consultation  If you have questions, please do not hesitate to call me  I look forward to following your patient along with you  Sincerely,        Claire Montes De Oca MD        CC: No Recipients  Claire Montes De Oca MD  12/4/2019 12:14 PM  Sign at close encounter  Kuldip E Vargas 75 y o  male MRN: [id number]  DATE: 12/04/19  Reason for visit: Continued evaluation of CKD    ASSESSMENT & PLAN:  1  Chronic kidney disease, stage III:   · Tyler's baseline serum creatinine is in the range of 1 2 to 1 3    · His CKD is possibly from DM nephropathy vs hypertensive nephrosclerosis  · His most recent SCr is 1 10 from Oct 2019 - stable renal function  2  Edema:  · He stopped taking the Torsemide over the summer  · Given his ongoing edema, I advised him to restart Torsemide at 20 mg daily  · Check BMP 7-10 days after restarting Torsemide  3  Hypertension:  · BP is close to goal with Losartan 100 mg daily, Metoprolol 50 mg BID  · Should improved with restarting Torsemide 20 mg daily  4  Microalbuminuria:  · UPC is stable at 0 166  · Continue Losartan  5  Mineral and bone disease:   · PTH is 24 7 in Oct 2019 - at goal    · Ca and Phos are at goal    · Continue Vitamin D3 2000 units daily  Patient Instructions   Please restart Torsemide 20 mg daily   Check BMP 7-10 days after starting Torsemide  Please purchase a BP machine and check BP twice daily x 1 week before the next visit  Bring your BP log with next visit  Follow up in 1 year  SUBJECTIVE / INTERVAL HISTORY:  Aries Garcia was last seen in the office in October 2018     No recent hospital stays or ER visits since last visit  He has lost some weight and was reportedly down to < 300 lbs a month ago  He started [facility] in the summer and is doing well with it  He stopped Torsemide when he started [employer]  Not checking BP at home  No other complaints  Not physically active  PMH/PSH: HTN, DM, HLP, CKD, obesity, GUICHO, sarcoidosis, nephrolithiasis, R sided hydronephrosis  Previous work up:   7/27/17 Echocardiogram: EF 65%, G1DD, moderate concentric hypertrophy  ALLERGIES:   Allergies   Allergen Reactions    Wound Dressing Adhesive      REVIEW OF SYSTEMS:  Review of Systems   Constitutional: Negative for appetite change, chills, fatigue and fever  Respiratory: Negative for cough and shortness of breath  Cardiovascular: Positive for leg swelling  Negative for chest pain  Gastrointestinal: Negative for abdominal pain, diarrhea, nausea and vomiting  Genitourinary: Negative for dysuria and hematuria  Musculoskeletal: Negative for arthralgias and back pain  Allergic/Immunologic: Negative for immunocompromised state  Neurological: Negative for dizziness and light-headedness  OBJECTIVE:  /78   Ht 5' 6" (1 676 m)   Wt (!) 140 kg (308 lb)   BMI 49 71 kg/m²    Current Weight:   Body mass index is 49 71 kg/m²  Physical Exam   Constitutional: He is oriented to person, place, and time  He appears well-developed and well-nourished  No distress  HENT:   Head: Normocephalic and atraumatic  Mouth/Throat: Mucous membranes are normal    Eyes: Conjunctivae are normal  No scleral icterus  Neck: Neck supple  No JVD present  Cardiovascular: Normal rate, regular rhythm and normal heart sounds  Pulmonary/Chest: Effort normal and breath sounds normal  No respiratory distress  Abdominal: Soft  Bowel sounds are normal    Musculoskeletal: He exhibits edema (moderate to severe)  Neurological: He is alert and oriented to person, place, and time  Skin: Skin is warm and dry     Psychiatric: He has a normal mood and affect   His behavior is normal  Judgment normal      Medications:  Current Outpatient Medications:     Cholecalciferol (VITAMIN D3) 2000 units capsule, Take 1 capsule by mouth daily, Disp: , Rfl:     cyclobenzaprine (FLEXERIL) 10 mg tablet, Take 1 tablet (10 mg total) by mouth 3 (three) times a day as needed for muscle spasms (Patient not taking: Reported on 8/13/2019), Disp: 15 tablet, Rfl: 0    finasteride (PROSCAR) 5 mg tablet, Take 1 tablet by mouth daily, Disp: , Rfl:     glimepiride (AMARYL) 4 mg tablet, Take 1 tablet by mouth 2 (two) times a day, Disp: , Rfl:     Linagliptin (TRADJENTA) 5 MG TABS, Take 1 tablet by mouth daily, Disp: , Rfl:     losartan (COZAAR) 100 MG tablet, Take 1 tablet by mouth daily, Disp: , Rfl:     metFORMIN (GLUCOPHAGE) 1000 MG tablet, Take 1 tablet by mouth every 12 (twelve) hours, Disp: , Rfl:     metoprolol tartrate (LOPRESSOR) 50 mg tablet, Take 1 tablet by mouth 2 (two) times a day, Disp: , Rfl:     torsemide (DEMADEX) 20 mg tablet, TAKE 2 TABLETS BY MOUTH TWO TIMES DAILY (Patient not taking: Reported on 8/13/2019), Disp: 360 tablet, Rfl: 1    Laboratory Results:  Results for orders placed or performed in visit on 10/10/19   CBC   Result Value Ref Range    Sodium 145 136 - 145 mmol/L    Potassium 4 9 3 5 - 5 3 mmol/L    Chloride 103 100 - 108 mmol/L    CO2 26 21 - 32 mmol/L    BUN 17 5 - 25 mg/dL    Creatinine 1 10 0 60 - 1 30 mg/dL    GLUCOSE RANDOM 115 mg/dL    Calcium 9 5 8 3 - 10 1 mg/dL    Albumin 4 0     Alkaline Phosphatase 86 46 - 116 U/L    ALT 15 12 - 78 U/L    AST 15 5 - 45 U/L    GFR MDRD Non Af Amer 65 2 ml/min/1 73sq m    GFR MDRD Af Amer 79 ml/min/1 73sq m    Magnesium 1 8 1 6 - 2 6 mg/dL    PTH, Intact 24 67     Phosphorus 4 2 (A) 2 3 - 4 1 mg/dL

## 2019-12-04 NOTE — PROGRESS NOTES
[facility] OFFICE PROGRESS NOTE   Sophia Pearson 75 y o  male MRN: [id number]  DATE: 12/04/19  Reason for visit: Continued evaluation of CKD    ASSESSMENT & PLAN:  1  Chronic kidney disease, stage III:   · Tyler's baseline serum creatinine is in the range of 1 2 to 1 3    · His CKD is possibly from DM nephropathy vs hypertensive nephrosclerosis  · His most recent SCr is 1 10 from Oct 2019 - stable renal function  2  Edema:  · He stopped taking the Torsemide over the summer  · Given his ongoing edema, I advised him to restart Torsemide at 20 mg daily  · Check BMP 7-10 days after restarting Torsemide  3  Hypertension:  · BP is close to goal with Losartan 100 mg daily, Metoprolol 50 mg BID  · Should improved with restarting Torsemide 20 mg daily  4  Microalbuminuria:  · UPC is stable at 0 166  · Continue Losartan  5  Mineral and bone disease:   · PTH is 24 7 in Oct 2019 - at goal    · Ca and Phos are at goal    · Continue Vitamin D3 2000 units daily  Patient Instructions   Please restart Torsemide 20 mg daily   Check BMP 7-10 days after starting Torsemide  Please purchase a BP machine and check BP twice daily x 1 week before the next visit  Bring your BP log with next visit  Follow up in 1 year  SUBJECTIVE / INTERVAL HISTORY:  Rand Wall was last seen in the office in October 2018  No recent hospital stays or ER visits since last visit  He has lost some weight and was reportedly down to < 300 lbs a month ago  He started [facility] in the summer and is doing well with it  He stopped Torsemide when he started [employer]  Not checking BP at home  No other complaints  Not physically active  PMH/PSH: HTN, DM, HLP, CKD, obesity, GUICHO, sarcoidosis, nephrolithiasis, R sided hydronephrosis  Previous work up:   7/27/17 Echocardiogram: EF 65%, G1DD, moderate concentric hypertrophy       ALLERGIES:   Allergies   Allergen Reactions    Wound Dressing Adhesive      REVIEW OF SYSTEMS:  Review of Systems   Constitutional: Negative for appetite change, chills, fatigue and fever  Respiratory: Negative for cough and shortness of breath  Cardiovascular: Positive for leg swelling  Negative for chest pain  Gastrointestinal: Negative for abdominal pain, diarrhea, nausea and vomiting  Genitourinary: Negative for dysuria and hematuria  Musculoskeletal: Negative for arthralgias and back pain  Allergic/Immunologic: Negative for immunocompromised state  Neurological: Negative for dizziness and light-headedness  OBJECTIVE:  /78   Ht 5' 6" (1 676 m)   Wt (!) 140 kg (308 lb)   BMI 49 71 kg/m²   Current Weight:   Body mass index is 49 71 kg/m²  Physical Exam   Constitutional: He is oriented to person, place, and time  He appears well-developed and well-nourished  No distress  HENT:   Head: Normocephalic and atraumatic  Mouth/Throat: Mucous membranes are normal    Eyes: Conjunctivae are normal  No scleral icterus  Neck: Neck supple  No JVD present  Cardiovascular: Normal rate, regular rhythm and normal heart sounds  Pulmonary/Chest: Effort normal and breath sounds normal  No respiratory distress  Abdominal: Soft  Bowel sounds are normal    Musculoskeletal: He exhibits edema (moderate to severe)  Neurological: He is alert and oriented to person, place, and time  Skin: Skin is warm and dry  Psychiatric: He has a normal mood and affect   His behavior is normal  Judgment normal      Medications:  Current Outpatient Medications:     Cholecalciferol (VITAMIN D3) 2000 units capsule, Take 1 capsule by mouth daily, Disp: , Rfl:     cyclobenzaprine (FLEXERIL) 10 mg tablet, Take 1 tablet (10 mg total) by mouth 3 (three) times a day as needed for muscle spasms (Patient not taking: Reported on 8/13/2019), Disp: 15 tablet, Rfl: 0    finasteride (PROSCAR) 5 mg tablet, Take 1 tablet by mouth daily, Disp: , Rfl:     glimepiride (AMARYL) 4 mg tablet, Take 1 tablet by mouth 2 (two) times a day, Disp: , Rfl:     Linagliptin (TRADJENTA) 5 MG TABS, Take 1 tablet by mouth daily, Disp: , Rfl:     losartan (COZAAR) 100 MG tablet, Take 1 tablet by mouth daily, Disp: , Rfl:     metFORMIN (GLUCOPHAGE) 1000 MG tablet, Take 1 tablet by mouth every 12 (twelve) hours, Disp: , Rfl:     metoprolol tartrate (LOPRESSOR) 50 mg tablet, Take 1 tablet by mouth 2 (two) times a day, Disp: , Rfl:     torsemide (DEMADEX) 20 mg tablet, TAKE 2 TABLETS BY MOUTH TWO TIMES DAILY (Patient not taking: Reported on 8/13/2019), Disp: 360 tablet, Rfl: 1    Laboratory Results:  Results for orders placed or performed in visit on 10/10/19   CBC   Result Value Ref Range    Sodium 145 136 - 145 mmol/L    Potassium 4 9 3 5 - 5 3 mmol/L    Chloride 103 100 - 108 mmol/L    CO2 26 21 - 32 mmol/L    BUN 17 5 - 25 mg/dL    Creatinine 1 10 0 60 - 1 30 mg/dL    GLUCOSE RANDOM 115 mg/dL    Calcium 9 5 8 3 - 10 1 mg/dL    Albumin 4 0     Alkaline Phosphatase 86 46 - 116 U/L    ALT 15 12 - 78 U/L    AST 15 5 - 45 U/L    GFR MDRD Non Af Amer 65 2 ml/min/1 73sq m    GFR MDRD Af Amer 79 ml/min/1 73sq m    Magnesium 1 8 1 6 - 2 6 mg/dL    PTH, Intact 24 67     Phosphorus 4 2 (A) 2 3 - 4 1 mg/dL

## 2019-12-04 NOTE — PATIENT INSTRUCTIONS
Please restart Torsemide 20 mg daily   Check BMP 7-10 days after starting Torsemide  Please purchase a BP machine and check BP twice daily x 1 week before the next visit  Bring your BP log with next visit  Follow up in 1 year

## 2020-02-19 LAB — HBA1C MFR BLD HPLC: 8 %

## 2020-02-26 ENCOUNTER — TELEPHONE (OUTPATIENT)
Dept: NEPHROLOGY | Facility: CLINIC | Age: 75
End: 2020-02-26

## 2020-02-26 ENCOUNTER — DOCUMENTATION (OUTPATIENT)
Dept: NEPHROLOGY | Facility: CLINIC | Age: 75
End: 2020-02-26

## 2020-02-26 LAB
ALBUMIN SNV-MCNC: 4.1 G/DL
ALP SERPL-CCNC: 77 U/L (ref 46–116)
ALT SERPL W P-5'-P-CCNC: 16 U/L (ref 12–78)
AST SERPL W P-5'-P-CCNC: 17 U/L (ref 5–45)
BUN SERPL-MCNC: 19 MG/DL (ref 5–25)
CALCIUM SERPL-MCNC: 9.5 MG/DL (ref 8.3–10.1)
CHLORIDE SERPL-SCNC: 101 MMOL/L (ref 100–108)
CO2 SERPL-SCNC: 28 MMOL/L (ref 21–32)
CREAT SERPL-MCNC: 1.2 MG/DL (ref 0.6–1.3)
ERYTHROCYTE [SEDIMENTATION RATE] IN BLOOD: 29 MM/HOUR (ref 0–10)
GLUCOSE SERPL-MCNC: 117 MG/DL
HBA1C MFR BLD: 8 % (ref 3.8–5.6)
HCT VFR BLD AUTO: 51 % (ref 36.5–49.3)
HGB BLD-MCNC: 15.9 G/DL (ref 12–17)
MAGNESIUM SERPL-MCNC: 2.1 MG/DL (ref 1.6–2.6)
PLATELET # BLD AUTO: 254 THOUSANDS/UL (ref 149–390)
POTASSIUM SERPL-SCNC: 4.9 MMOL/L (ref 3.5–5.3)
PSA SERPL-MCNC: 2.1 NG/ML (ref 0–4)
PTH-INTACT SERPL-MCNC: 40.31 PG/ML
SODIUM SERPL-SCNC: 145 MMOL/L (ref 136–145)
TSH SERPL DL<=0.005 MIU/L-ACNC: 2.13 M[IU]/L
WBC # BLD AUTO: 7.2 THOUSAND/UL

## 2020-02-26 NOTE — TELEPHONE ENCOUNTER
Message given to patient's wife that kidney function is stable per Dr Ivonne Pérez       ----- Message from Aby Rivas MD sent at 2/26/2020  4:46 PM EST -----  Please call patient and let him know that kidney function is stable

## 2020-08-18 ENCOUNTER — OFFICE VISIT (OUTPATIENT)
Dept: SLEEP CENTER | Facility: CLINIC | Age: 75
End: 2020-08-18
Payer: MEDICARE

## 2020-08-18 VITALS
BODY MASS INDEX: 48.86 KG/M2 | SYSTOLIC BLOOD PRESSURE: 122 MMHG | DIASTOLIC BLOOD PRESSURE: 68 MMHG | HEIGHT: 66 IN | WEIGHT: 304 LBS

## 2020-08-18 DIAGNOSIS — G47.33 OSA (OBSTRUCTIVE SLEEP APNEA): Primary | ICD-10-CM

## 2020-08-18 PROCEDURE — 99213 OFFICE O/P EST LOW 20 MIN: CPT | Performed by: INTERNAL MEDICINE

## 2020-08-18 NOTE — PROGRESS NOTES
Progress Note - Sleep Center   Maurice Shirley GRB:[birth year] MRN: [id number]      Reason for Visit:  75 y  o male here for annual follow-up    Assessment:  Doing well on current therapy of BiPAP 21/15 cm for severe GUICHO (AHI = 56 6)  Plan:  Continue same    Follow up: One year    History of Present Illness:  History of GUICHO on PAP therapy  Fully compliant and deriving benefit  Review of Systems      Genitourinary difficulty with erection   Cardiology ankle/leg swelling   Gastrointestinal none   Neurology none   Constitutional none   Integumentary none   Psychiatry none   Musculoskeletal none   Pulmonary none   ENT none   Endocrine none   Hematological none       I have reviewed and updated the review of systems as necessary      Historical Information    Past Medical History:   Past Medical History:   Diagnosis Date    Bilateral leg edema     Chronic kidney disease     Diastolic dysfunction     Hypertension          Past Surgical History: History reviewed  No pertinent surgical history  Social History:   Social History     Socioeconomic History    Marital status: /Civil Union     Spouse name: None    Number of children: None    Years of education: None    Highest education level: None   Occupational History    None   Social Needs    Financial resource strain: None    Food insecurity     Worry: None     Inability: None    Transportation needs     Medical: None     Non-medical: None   Tobacco Use    Smoking status: Never Smoker    Smokeless tobacco: Never Used   Substance and Sexual Activity    Alcohol use:  Yes     Alcohol/week: 1 0 standard drinks     Types: 1 Cans of beer per week     Comment: weekly    Drug use: No    Sexual activity: None   Lifestyle    Physical activity     Days per week: None     Minutes per session: None    Stress: None   Relationships    Social connections     Talks on phone: None     Gets together: None     Attends Scientologist service: None     Active member of club or organization: None     Attends meetings of clubs or organizations: None     Relationship status: None    Intimate partner violence     Fear of current or ex partner: None     Emotionally abused: None     Physically abused: None     Forced sexual activity: None   Other Topics Concern    None   Social History Narrative    None       Family History:   Family History   Problem Relation Age of Onset    Diabetes Mother     Nephrolithiasis Mother        Medications/Allergies:      Current Outpatient Medications:     Cholecalciferol (VITAMIN D3) 2000 units capsule, Take 1 capsule by mouth daily, Disp: , Rfl:     cyclobenzaprine (FLEXERIL) 10 mg tablet, Take 1 tablet (10 mg total) by mouth 3 (three) times a day as needed for muscle spasms (Patient not taking: Reported on 8/13/2019), Disp: 15 tablet, Rfl: 0    Empagliflozin-linaGLIPtin (GLYXAMBI) 25-5 MG TABS, Take by mouth daily, Disp: , Rfl:     finasteride (PROSCAR) 5 mg tablet, Take 1 tablet by mouth daily, Disp: , Rfl:     glimepiride (AMARYL) 4 mg tablet, Take 1 tablet by mouth 2 (two) times a day, Disp: , Rfl:     Linagliptin (TRADJENTA) 5 MG TABS, Take 1 tablet by mouth daily, Disp: , Rfl:     losartan (COZAAR) 100 MG tablet, Take 1 tablet by mouth daily, Disp: , Rfl:     metFORMIN (GLUCOPHAGE) 1000 MG tablet, Take 1 tablet by mouth every 12 (twelve) hours, Disp: , Rfl:     metoprolol tartrate (LOPRESSOR) 50 mg tablet, Take 1 tablet by mouth 2 (two) times a day, Disp: , Rfl:     torsemide (DEMADEX) 20 mg tablet, Take 1 tablet (20 mg total) by mouth daily, Disp: 90 tablet, Rfl: 2          Objective      Vital Signs:   Vitals:    08/18/20 0900   BP: 122/68     Davis Sleepiness Scale: Total score: 2        Physical Exam:    General: Alert, appropriate, cooperative, overweight    Head: NC/AT    Skin: Warm, dry    Neuro: No motor abnormalities, cranial nerves appear intact    Extremity: No clubbing, cyanosis      DME Provider:   [facility]        Counseling / Coordination of Care   I have spent 15 minutes with the patient today in which greater than 50% of this time was spent in counseling/coordination of care regarding: equipment and compliance  Board Certified Sleep Specialist    Portions of the record may have been created with voice recognition software  Occasional wrong word or "sound a like" substitutions may have occurred due to the inherent limitations of voice recognition software  Read the chart carefully and recognize, using context, where substitutions have occurred

## 2020-10-13 ENCOUNTER — TELEPHONE (OUTPATIENT)
Dept: NEPHROLOGY | Facility: CLINIC | Age: 75
End: 2020-10-13

## 2021-02-13 ENCOUNTER — IMMUNIZATIONS (OUTPATIENT)
Dept: FAMILY MEDICINE CLINIC | Facility: HOSPITAL | Age: 76
End: 2021-02-13

## 2021-02-13 DIAGNOSIS — Z23 ENCOUNTER FOR IMMUNIZATION: Primary | ICD-10-CM

## 2021-02-13 PROCEDURE — 91300 SARS-COV-2 / COVID-19 MRNA VACCINE (PFIZER-BIONTECH) 30 MCG: CPT

## 2021-02-13 PROCEDURE — 0001A SARS-COV-2 / COVID-19 MRNA VACCINE (PFIZER-BIONTECH) 30 MCG: CPT

## 2021-03-06 ENCOUNTER — IMMUNIZATIONS (OUTPATIENT)
Dept: FAMILY MEDICINE CLINIC | Facility: HOSPITAL | Age: 76
End: 2021-03-06

## 2021-03-06 DIAGNOSIS — Z23 ENCOUNTER FOR IMMUNIZATION: Primary | ICD-10-CM

## 2021-03-06 PROCEDURE — 91300 SARS-COV-2 / COVID-19 MRNA VACCINE (PFIZER-BIONTECH) 30 MCG: CPT

## 2021-03-06 PROCEDURE — 0002A SARS-COV-2 / COVID-19 MRNA VACCINE (PFIZER-BIONTECH) 30 MCG: CPT

## 2021-08-19 ENCOUNTER — OFFICE VISIT (OUTPATIENT)
Dept: SLEEP CENTER | Facility: CLINIC | Age: 76
End: 2021-08-19
Payer: MEDICARE

## 2021-08-19 VITALS
BODY MASS INDEX: 47.92 KG/M2 | DIASTOLIC BLOOD PRESSURE: 68 MMHG | SYSTOLIC BLOOD PRESSURE: 122 MMHG | HEIGHT: 66 IN | WEIGHT: 298.2 LBS

## 2021-08-19 DIAGNOSIS — G47.33 OSA (OBSTRUCTIVE SLEEP APNEA): Primary | ICD-10-CM

## 2021-08-19 PROCEDURE — 99213 OFFICE O/P EST LOW 20 MIN: CPT | Performed by: INTERNAL MEDICINE

## 2021-08-19 NOTE — PROGRESS NOTES
Progress Note - Sleep Center   Allen Escoto GZP:[birth year] MRN: [id number]      Reason for Visit:  67 y o male here for annual follow-up    Assessment:  Doing well on current therapy of BPAP Auto for severe GUICHO (AHI=56 6)  The patient's current machine is not functioning properly  He needs a replacement  Plan:  New BPAP Auto    Follow up:  Compliance check    History of Present Illness:  History of GUICHO on PAP therapy  Fully compliant and deriving benefit  Review of Systems      Genitourinary difficulty with erection   Cardiology ankle/leg swelling   Gastrointestinal none   Neurology none   Constitutional none   Integumentary none   Psychiatry none   Musculoskeletal none   Pulmonary none   ENT none   Endocrine none   Hematological none           I have reviewed and updated the review of systems as necessary      Historical Information    Past Medical History:   Past Medical History:   Diagnosis Date    Bilateral leg edema     Chronic kidney disease     Diastolic dysfunction     Hypertension          Past Surgical History: No past surgical history on file  Social History:   Social History     Socioeconomic History    Marital status: /Civil Union     Spouse name: None    Number of children: None    Years of education: None    Highest education level: None   Occupational History    None   Tobacco Use    Smoking status: Never Smoker    Smokeless tobacco: Never Used   Substance and Sexual Activity    Alcohol use:  Yes     Alcohol/week: 1 0 standard drinks     Types: 1 Cans of beer per week     Comment: weekly    Drug use: No    Sexual activity: None   Other Topics Concern    None   Social History Narrative    None     Social Determinants of Health     Financial Resource Strain:     Difficulty of Paying Living Expenses:    Food Insecurity:     Worried About Running Out of Food in the Last Year:     Ran Out of Food in the Last Year:    Transportation Needs:     Lack of Transportation (Medical):      Lack of Transportation (Non-Medical):    Physical Activity:     Days of Exercise per Week:     Minutes of Exercise per Session:    Stress:     Feeling of Stress :    Social Connections:     Frequency of Communication with Friends and Family:     Frequency of Social Gatherings with Friends and Family:     Attends Yarsanism Services:     Active Member of Clubs or Organizations:     Attends Club or Organization Meetings:     Marital Status:    Intimate Partner Violence:     Fear of Current or Ex-Partner:     Emotionally Abused:     Physically Abused:     Sexually Abused:        Family History:   Family History   Problem Relation Age of Onset    Diabetes Mother     Nephrolithiasis Mother        Medications/Allergies:      Current Outpatient Medications:     Cholecalciferol (VITAMIN D3) 2000 units capsule, Take 1 capsule by mouth daily, Disp: , Rfl:     Empagliflozin-linaGLIPtin (GLYXAMBI) 25-5 MG TABS, Take by mouth daily, Disp: , Rfl:     finasteride (PROSCAR) 5 mg tablet, Take 1 tablet by mouth daily, Disp: , Rfl:     glimepiride (AMARYL) 4 mg tablet, Take 1 tablet by mouth 2 (two) times a day, Disp: , Rfl:     Linagliptin (TRADJENTA) 5 MG TABS, Take 1 tablet by mouth daily, Disp: , Rfl:     losartan (COZAAR) 100 MG tablet, Take 1 tablet by mouth daily, Disp: , Rfl:     metFORMIN (GLUCOPHAGE) 1000 MG tablet, Take 1 tablet by mouth every 12 (twelve) hours, Disp: , Rfl:     metoprolol tartrate (LOPRESSOR) 50 mg tablet, Take 1 tablet by mouth 2 (two) times a day, Disp: , Rfl:     torsemide (DEMADEX) 20 mg tablet, Take 1 tablet (20 mg total) by mouth daily, Disp: 90 tablet, Rfl: 2    cyclobenzaprine (FLEXERIL) 10 mg tablet, Take 1 tablet (10 mg total) by mouth 3 (three) times a day as needed for muscle spasms (Patient not taking: Reported on 8/13/2019), Disp: 15 tablet, Rfl: 0          Objective      Vital Signs:   Vitals:    08/19/21 1132   BP: 122/68     [place] Sleepiness Scale: Total score: 1        Physical Exam:    General: Alert, appropriate, cooperative, overweight    Head: NC/AT    Skin: Warm, dry    Neuro: No motor abnormalities, cranial nerves appear intact    Extremity: No clubbing, cyanosis      DME Provider: [facility]        Counseling / Coordination of Care   I have spent 20 minutes with the patient today in which greater than 50% of this time was spent in counseling/coordination of care regarding: equipment and compliance  Board Certified Sleep Specialist    Portions of the record may have been created with voice recognition software  Occasional wrong word or "sound a like" substitutions may have occurred due to the inherent limitations of voice recognition software  Read the chart carefully and recognize, using context, where substitutions have occurred

## 2021-08-24 ENCOUNTER — TELEPHONE (OUTPATIENT)
Dept: SLEEP CENTER | Facility: CLINIC | Age: 76
End: 2021-08-24

## 2021-08-24 NOTE — TELEPHONE ENCOUNTER
Returned call from patient's wife who states patient needs an order for CPAP supplies  States he gets supplies from [address] [place] [address] and they have been trying to contact our office and has not received a response  Spoke to wife Kelsey Lamar and advised our office has not received anything from [facility]  I will send a message to Dr Marin King to write an order for resupply and will fax to [facility]  Wife does not have phone number or fax number for [facility]  She call call back later to provide that information  Dr Marin King, please write order for resupply  Per DME order from 8/19/21 patient uses nasal cushions

## 2021-08-24 NOTE — TELEPHONE ENCOUNTER
Patient's wife left voice message regarding [facility]  Phone [phone]  Fax [phone]

## 2021-09-02 ENCOUNTER — TELEPHONE (OUTPATIENT)
Dept: SLEEP CENTER | Facility: CLINIC | Age: 76
End: 2021-09-02

## 2021-09-02 NOTE — TELEPHONE ENCOUNTER
Phillip Gonzalez left message, like a call about recalled machine    I spoke with Phillip Gonzalez, states patient was to have appointment with [facility] medical today, and Ammy Walker called patient, not eligible for new machine, advised to call nurses for list of DME providers  Advised will email list of DME providers and copy of scipt  Phillip Gonzalez thankful

## 2021-10-09 ENCOUNTER — IMMUNIZATIONS (OUTPATIENT)
Dept: FAMILY MEDICINE CLINIC | Facility: HOSPITAL | Age: 76
End: 2021-10-09

## 2021-10-09 DIAGNOSIS — Z23 ENCOUNTER FOR IMMUNIZATION: Primary | ICD-10-CM

## 2021-10-09 PROCEDURE — 91300 SARS-COV-2 / COVID-19 MRNA VACCINE (PFIZER-BIONTECH) 30 MCG: CPT

## 2021-10-09 PROCEDURE — 0001A SARS-COV-2 / COVID-19 MRNA VACCINE (PFIZER-BIONTECH) 30 MCG: CPT

## 2022-09-21 ENCOUNTER — OFFICE VISIT (OUTPATIENT)
Dept: SLEEP CENTER | Facility: CLINIC | Age: 77
End: 2022-09-21
Payer: COMMERCIAL

## 2022-09-21 VITALS
DIASTOLIC BLOOD PRESSURE: 70 MMHG | HEIGHT: 66 IN | WEIGHT: 299.6 LBS | SYSTOLIC BLOOD PRESSURE: 140 MMHG | BODY MASS INDEX: 48.15 KG/M2

## 2022-09-21 DIAGNOSIS — G47.33 OSA (OBSTRUCTIVE SLEEP APNEA): Primary | ICD-10-CM

## 2022-09-21 PROCEDURE — 99213 OFFICE O/P EST LOW 20 MIN: CPT | Performed by: INTERNAL MEDICINE

## 2022-09-21 RX ORDER — GLIPIZIDE 5 MG/1
5 TABLET ORAL
COMMUNITY

## 2022-09-21 RX ORDER — MELOXICAM 15 MG/1
15 TABLET ORAL DAILY
COMMUNITY
Start: 2022-07-08

## 2022-09-21 NOTE — PROGRESS NOTES
Progress Note - Sleep Center   Shaggy Murphy [facility]:[id number] MRN: [id number]      Reason for Visit:  65 y o male here for annual follow-up    Assessment:  Doing well on current therapy of BPAP Auto for severe OA (AHI= 56 6)  Plan:  Continue same    Follow up: One year    History of Present Illness:  History of GUICHO on PAP therapy  Fully compliant and deriving benefit  Review of Systems      Genitourinary difficulty with erection   Cardiology ankle/leg swelling   Gastrointestinal none   Neurology none   Constitutional none   Integumentary none   Psychiatry none   Musculoskeletal none   Pulmonary none   ENT none   Endocrine none   Hematological none           I have reviewed and updated the review of systems as necessary      Historical Information    Past Medical History:   Past Medical History:   Diagnosis Date    Bilateral leg edema     Chronic kidney disease     Diastolic dysfunction     Hypertension          Past Surgical History: No past surgical history on file  Social History:   Social History     Socioeconomic History    Marital status: /Civil Union     Spouse name: Not on file    Number of children: Not on file    Years of education: Not on file    Highest education level: Not on file   Occupational History    Not on file   Tobacco Use    Smoking status: Never Smoker    Smokeless tobacco: Never Used   Substance and Sexual Activity    Alcohol use:  Yes     Alcohol/week: 1 0 standard drink     Types: 1 Cans of beer per week     Comment: weekly    Drug use: No    Sexual activity: Not on file   Other Topics Concern    Not on file   Social History Narrative    Not on file     Social Determinants of Health     Financial Resource Strain: Not on file   Food Insecurity: Not on file   Transportation Needs: Not on file   Physical Activity: Not on file   Stress: Not on file   Social Connections: Not on file   Intimate Partner Violence: Not on file   Housing Stability: Not on file Family History:   Family History   Problem Relation Age of Onset    Diabetes Mother     Nephrolithiasis Mother        Medications/Allergies:      Current Outpatient Medications:     Cholecalciferol (VITAMIN D3) 2000 units capsule, Take 1 capsule by mouth daily, Disp: , Rfl:     Empagliflozin-linaGLIPtin 25-5 MG TABS, Take by mouth daily, Disp: , Rfl:     finasteride (PROSCAR) 5 mg tablet, Take 1 tablet by mouth daily, Disp: , Rfl:     glipiZIDE (GLUCOTROL) 5 mg tablet, Take 5 mg by mouth 2 (two) times a day before meals, Disp: , Rfl:     losartan (COZAAR) 100 MG tablet, Take 1 tablet by mouth daily, Disp: , Rfl:     meloxicam (MOBIC) 15 mg tablet, Take 15 mg by mouth daily, Disp: , Rfl:     metFORMIN (GLUCOPHAGE) 1000 MG tablet, Take 1 tablet by mouth every 12 (twelve) hours, Disp: , Rfl:     metoprolol tartrate (LOPRESSOR) 50 mg tablet, Take 1 tablet by mouth 2 (two) times a day, Disp: , Rfl:     torsemide (DEMADEX) 20 mg tablet, Take 1 tablet (20 mg total) by mouth daily, Disp: 90 tablet, Rfl: 2    cyclobenzaprine (FLEXERIL) 10 mg tablet, Take 1 tablet (10 mg total) by mouth 3 (three) times a day as needed for muscle spasms (Patient not taking: No sig reported), Disp: 15 tablet, Rfl: 0    glimepiride (AMARYL) 4 mg tablet, Take 1 tablet by mouth 2 (two) times a day (Patient not taking: Reported on 9/21/2022), Disp: , Rfl:     linaGLIPtin 5 MG TABS, Take 1 tablet by mouth daily (Patient not taking: Reported on 9/21/2022), Disp: , Rfl:           Objective      Vital Signs:   Vitals:    09/21/22 1134   BP: 140/70     [place] Sleepiness Scale:  Total score: 2        Physical Exam:    General: Alert, appropriate, cooperative, overweight    Head: NC/AT    Skin: Warm, dry    Neuro: No motor abnormalities, cranial nerves appear intact    Extremity: No clubbing, cyanosis      DME Provider: [facility]        Counseling / Coordination of Care   I have spent 15 minutes with the patient today in which greater than 50% of this time was spent in counseling/coordination of care regarding: equipment and compliance  Board Certified Sleep Specialist    Portions of the record may have been created with voice recognition software  Occasional wrong word or "sound a like" substitutions may have occurred due to the inherent limitations of voice recognition software  Read the chart carefully and recognize, using context, where substitutions have occurred

## 2022-09-22 ENCOUNTER — TELEPHONE (OUTPATIENT)
Dept: SLEEP CENTER | Facility: CLINIC | Age: 77
End: 2022-09-22